# Patient Record
Sex: MALE | Race: WHITE | NOT HISPANIC OR LATINO
[De-identification: names, ages, dates, MRNs, and addresses within clinical notes are randomized per-mention and may not be internally consistent; named-entity substitution may affect disease eponyms.]

---

## 2019-09-07 ENCOUNTER — APPOINTMENT (OUTPATIENT)
Dept: ORTHOPEDIC SURGERY | Facility: CLINIC | Age: 59
End: 2019-09-07
Payer: COMMERCIAL

## 2019-09-07 DIAGNOSIS — M25.561 PAIN IN RIGHT KNEE: ICD-10-CM

## 2019-09-07 PROCEDURE — 99204 OFFICE O/P NEW MOD 45 MIN: CPT

## 2019-09-07 PROCEDURE — 73562 X-RAY EXAM OF KNEE 3: CPT | Mod: RT

## 2019-10-21 ENCOUNTER — APPOINTMENT (OUTPATIENT)
Dept: ORTHOPEDIC SURGERY | Facility: CLINIC | Age: 59
End: 2019-10-21
Payer: COMMERCIAL

## 2019-10-21 VITALS — HEART RATE: 79 BPM | DIASTOLIC BLOOD PRESSURE: 84 MMHG | SYSTOLIC BLOOD PRESSURE: 137 MMHG

## 2019-10-21 DIAGNOSIS — M17.11 UNILATERAL PRIMARY OSTEOARTHRITIS, RIGHT KNEE: ICD-10-CM

## 2019-10-21 PROCEDURE — 99213 OFFICE O/P EST LOW 20 MIN: CPT

## 2019-10-22 ENCOUNTER — OUTPATIENT (OUTPATIENT)
Dept: OUTPATIENT SERVICES | Facility: HOSPITAL | Age: 59
LOS: 1 days | End: 2019-10-22
Payer: COMMERCIAL

## 2019-10-22 VITALS
HEIGHT: 70 IN | OXYGEN SATURATION: 98 % | HEART RATE: 71 BPM | RESPIRATION RATE: 16 BRPM | DIASTOLIC BLOOD PRESSURE: 82 MMHG | WEIGHT: 279.99 LBS | TEMPERATURE: 98 F | SYSTOLIC BLOOD PRESSURE: 132 MMHG

## 2019-10-22 DIAGNOSIS — Z01.818 ENCOUNTER FOR OTHER PREPROCEDURAL EXAMINATION: ICD-10-CM

## 2019-10-22 DIAGNOSIS — Z98.890 OTHER SPECIFIED POSTPROCEDURAL STATES: Chronic | ICD-10-CM

## 2019-10-22 DIAGNOSIS — M17.11 UNILATERAL PRIMARY OSTEOARTHRITIS, RIGHT KNEE: ICD-10-CM

## 2019-10-22 LAB
ALBUMIN SERPL ELPH-MCNC: 3.6 G/DL — SIGNIFICANT CHANGE UP (ref 3.3–5)
ALP SERPL-CCNC: 96 U/L — SIGNIFICANT CHANGE UP (ref 30–120)
ALT FLD-CCNC: 23 U/L DA — SIGNIFICANT CHANGE UP (ref 10–60)
ANION GAP SERPL CALC-SCNC: 6 MMOL/L — SIGNIFICANT CHANGE UP (ref 5–17)
APTT BLD: 36.6 SEC — SIGNIFICANT CHANGE UP (ref 28.5–37)
AST SERPL-CCNC: 17 U/L — SIGNIFICANT CHANGE UP (ref 10–40)
BILIRUB SERPL-MCNC: 0.6 MG/DL — SIGNIFICANT CHANGE UP (ref 0.2–1.2)
BLD GP AB SCN SERPL QL: SIGNIFICANT CHANGE UP
BUN SERPL-MCNC: 12 MG/DL — SIGNIFICANT CHANGE UP (ref 7–23)
CALCIUM SERPL-MCNC: 9.4 MG/DL — SIGNIFICANT CHANGE UP (ref 8.4–10.5)
CHLORIDE SERPL-SCNC: 101 MMOL/L — SIGNIFICANT CHANGE UP (ref 96–108)
CO2 SERPL-SCNC: 30 MMOL/L — SIGNIFICANT CHANGE UP (ref 22–31)
CREAT SERPL-MCNC: 1.23 MG/DL — SIGNIFICANT CHANGE UP (ref 0.5–1.3)
GLUCOSE SERPL-MCNC: 104 MG/DL — HIGH (ref 70–99)
HCT VFR BLD CALC: 45.4 % — SIGNIFICANT CHANGE UP (ref 39–50)
HGB BLD-MCNC: 14.8 G/DL — SIGNIFICANT CHANGE UP (ref 13–17)
INR BLD: 1.21 RATIO — HIGH (ref 0.88–1.16)
MCHC RBC-ENTMCNC: 27.7 PG — SIGNIFICANT CHANGE UP (ref 27–34)
MCHC RBC-ENTMCNC: 32.6 GM/DL — SIGNIFICANT CHANGE UP (ref 32–36)
MCV RBC AUTO: 85 FL — SIGNIFICANT CHANGE UP (ref 80–100)
MRSA PCR RESULT.: SIGNIFICANT CHANGE UP
NRBC # BLD: 0 /100 WBCS — SIGNIFICANT CHANGE UP (ref 0–0)
PLATELET # BLD AUTO: 200 K/UL — SIGNIFICANT CHANGE UP (ref 150–400)
POTASSIUM SERPL-MCNC: 4.5 MMOL/L — SIGNIFICANT CHANGE UP (ref 3.5–5.3)
POTASSIUM SERPL-SCNC: 4.5 MMOL/L — SIGNIFICANT CHANGE UP (ref 3.5–5.3)
PROT SERPL-MCNC: 7.7 G/DL — SIGNIFICANT CHANGE UP (ref 6–8.3)
PROTHROM AB SERPL-ACNC: 13.3 SEC — HIGH (ref 10–12.9)
RBC # BLD: 5.34 M/UL — SIGNIFICANT CHANGE UP (ref 4.2–5.8)
RBC # FLD: 12.6 % — SIGNIFICANT CHANGE UP (ref 10.3–14.5)
S AUREUS DNA NOSE QL NAA+PROBE: DETECTED
SODIUM SERPL-SCNC: 137 MMOL/L — SIGNIFICANT CHANGE UP (ref 135–145)
WBC # BLD: 8.78 K/UL — SIGNIFICANT CHANGE UP (ref 3.8–10.5)
WBC # FLD AUTO: 8.78 K/UL — SIGNIFICANT CHANGE UP (ref 3.8–10.5)

## 2019-10-22 PROCEDURE — 93005 ELECTROCARDIOGRAM TRACING: CPT

## 2019-10-22 PROCEDURE — G0463: CPT

## 2019-10-22 PROCEDURE — 93010 ELECTROCARDIOGRAM REPORT: CPT

## 2019-10-22 NOTE — H&P PST ADULT - MUSCULOSKELETAL
detailed exam no joint erythema/joint swelling/diminished strength/no joint warmth/no calf tenderness/right knee/decreased ROM due to pain details…

## 2019-10-22 NOTE — H&P PST ADULT - NSICDXFAMILYHX_GEN_ALL_CORE_FT
FAMILY HISTORY:  Family history of breast cancer in mother,   Family history of coronary artery disease in father, father-  Family history of diabetes mellitus in father, father-

## 2019-10-22 NOTE — H&P PST ADULT - NSICDXPROBLEM_GEN_ALL_CORE_FT
PROBLEM DIAGNOSES  Problem: Primary osteoarthritis of right knee  Assessment and Plan: RIGHT Total Knee Replacement on 11/04/19.

## 2019-10-22 NOTE — H&P PST ADULT - ASSESSMENT
60yo male patient scheduled for surgery on 11/4/19. He will obtain Medical Clearance from his PMD. He will be NPO as per Anesthesia and will take no meds on AM of surgery. All pre-op instructions reviewed with patient. 60yo male patient scheduled for surgery on 11/4/19. He will obtain Medical Clearance from his PMD. He will be NPO as per Anesthesia and will take no meds on AM of surgery. He will hold Ibuprofen starting on 10/28/19. All pre-op instructions reviewed with patient.

## 2019-10-22 NOTE — H&P PST ADULT - HISTORY OF PRESENT ILLNESS
60yo male patient who states that he has a two year history of progressively worsening right knee pain. He states that with weight bearing his knee starts to swell. He rates the pain at 10/10 after prolonged standing, he states that his knee is "on fire" and his knee pain is also problematic at night. He is not taking anything for pain. He was told that TKR is recommended and he presents today for PSTs.

## 2019-10-22 NOTE — H&P PST ADULT - NSICDXPASTMEDICALHX_GEN_ALL_CORE_FT
PAST MEDICAL HISTORY:  Class 3 severe obesity with body mass index (BMI) of 40.0 to 44.9 in adult     DUANE (obstructive sleep apnea) no treatment PAST MEDICAL HISTORY:  Class 3 severe obesity with body mass index (BMI) of 40.0 to 44.9 in adult     DUANE (obstructive sleep apnea) no treatment    Osteoarthritis PAST MEDICAL HISTORY:  Class 3 severe obesity with body mass index (BMI) of 40.0 to 44.9 in adult     Motion sickness     DUANE (obstructive sleep apnea) no treatment    Osteoarthritis

## 2019-10-22 NOTE — H&P PST ADULT - NSICDXPASTSURGICALHX_GEN_ALL_CORE_FT
PAST SURGICAL HISTORY:  S/P ORIF (open reduction internal fixation) fracture 1994- LEFT Knee    S/P right knee surgery 1977- ligament repair

## 2019-10-23 RX ORDER — MUPIROCIN 20 MG/G
1 OINTMENT TOPICAL
Qty: 1 | Refills: 0
Start: 2019-10-23 | End: 2019-10-27

## 2019-10-23 NOTE — PROGRESS NOTE ADULT - SUBJECTIVE AND OBJECTIVE BOX
Positive MSSA. Prescribed ointment. Unable to reach patient to leave message. Will call surgeon. Need to f/U Positive MSSA. Prescribed ointment. Unable to reach patient to leave message. Will call surgeon. Need to f/U    Attempted to reach patient, answering machine is not taking messages.  NP called patient's pharmacy and was told that patient did NOT  his prescription yet for mupirocin.  This message was relayed to answering machine of VIRA Guillen NP    SS

## 2019-10-29 NOTE — PROVIDER CONTACT NOTE (OTHER) - ACTION/TREATMENT ORDERED:
called pt cell #. Not taking messages. Called wife, Camille, who states pt picked up Mupirocin and has been using twice a day. Pt's wife to remind pt to use for 5 days for a total of 10 doses.

## 2019-11-01 LAB
INR BLD: 1.17 RATIO — HIGH (ref 0.88–1.16)
PROTHROM AB SERPL-ACNC: 12.8 SEC — SIGNIFICANT CHANGE UP (ref 10–12.9)

## 2019-11-04 ENCOUNTER — APPOINTMENT (OUTPATIENT)
Dept: ORTHOPEDIC SURGERY | Facility: HOSPITAL | Age: 59
End: 2019-11-04

## 2019-11-04 ENCOUNTER — INPATIENT (INPATIENT)
Facility: HOSPITAL | Age: 59
LOS: 2 days | Discharge: HOME | DRG: 470 | End: 2019-11-07
Attending: ORTHOPAEDIC SURGERY | Admitting: ORTHOPAEDIC SURGERY
Payer: COMMERCIAL

## 2019-11-04 ENCOUNTER — RESULT REVIEW (OUTPATIENT)
Age: 59
End: 2019-11-04

## 2019-11-04 VITALS
HEART RATE: 75 BPM | OXYGEN SATURATION: 99 % | TEMPERATURE: 98 F | DIASTOLIC BLOOD PRESSURE: 75 MMHG | SYSTOLIC BLOOD PRESSURE: 167 MMHG | WEIGHT: 273.37 LBS | HEIGHT: 70 IN | RESPIRATION RATE: 16 BRPM

## 2019-11-04 DIAGNOSIS — Z98.890 OTHER SPECIFIED POSTPROCEDURAL STATES: Chronic | ICD-10-CM

## 2019-11-04 DIAGNOSIS — G47.33 OBSTRUCTIVE SLEEP APNEA (ADULT) (PEDIATRIC): ICD-10-CM

## 2019-11-04 DIAGNOSIS — E66.01 MORBID (SEVERE) OBESITY DUE TO EXCESS CALORIES: ICD-10-CM

## 2019-11-04 DIAGNOSIS — M17.11 UNILATERAL PRIMARY OSTEOARTHRITIS, RIGHT KNEE: ICD-10-CM

## 2019-11-04 DIAGNOSIS — M19.90 UNSPECIFIED OSTEOARTHRITIS, UNSPECIFIED SITE: ICD-10-CM

## 2019-11-04 PROBLEM — T75.3XXA MOTION SICKNESS, INITIAL ENCOUNTER: Chronic | Status: ACTIVE | Noted: 2019-10-22

## 2019-11-04 LAB
ANION GAP SERPL CALC-SCNC: 9 MMOL/L — SIGNIFICANT CHANGE UP (ref 5–17)
BUN SERPL-MCNC: 17 MG/DL — SIGNIFICANT CHANGE UP (ref 7–23)
CALCIUM SERPL-MCNC: 9.3 MG/DL — SIGNIFICANT CHANGE UP (ref 8.4–10.5)
CHLORIDE SERPL-SCNC: 99 MMOL/L — SIGNIFICANT CHANGE UP (ref 96–108)
CO2 SERPL-SCNC: 28 MMOL/L — SIGNIFICANT CHANGE UP (ref 22–31)
CREAT SERPL-MCNC: 1.29 MG/DL — SIGNIFICANT CHANGE UP (ref 0.5–1.3)
GLUCOSE SERPL-MCNC: 147 MG/DL — HIGH (ref 70–99)
HCT VFR BLD CALC: 45.7 % — SIGNIFICANT CHANGE UP (ref 39–50)
HGB BLD-MCNC: 14.9 G/DL — SIGNIFICANT CHANGE UP (ref 13–17)
POTASSIUM SERPL-MCNC: 4.7 MMOL/L — SIGNIFICANT CHANGE UP (ref 3.5–5.3)
POTASSIUM SERPL-SCNC: 4.7 MMOL/L — SIGNIFICANT CHANGE UP (ref 3.5–5.3)
SODIUM SERPL-SCNC: 136 MMOL/L — SIGNIFICANT CHANGE UP (ref 135–145)

## 2019-11-04 PROCEDURE — 27447 TOTAL KNEE ARTHROPLASTY: CPT | Mod: RT

## 2019-11-04 PROCEDURE — 99223 1ST HOSP IP/OBS HIGH 75: CPT

## 2019-11-04 PROCEDURE — 88305 TISSUE EXAM BY PATHOLOGIST: CPT | Mod: 26

## 2019-11-04 PROCEDURE — 88311 DECALCIFY TISSUE: CPT | Mod: 26

## 2019-11-04 PROCEDURE — 73562 X-RAY EXAM OF KNEE 3: CPT | Mod: 26,RT

## 2019-11-04 PROCEDURE — 27447 TOTAL KNEE ARTHROPLASTY: CPT | Mod: 82,RT

## 2019-11-04 RX ORDER — SODIUM CHLORIDE 9 MG/ML
1000 INJECTION, SOLUTION INTRAVENOUS
Refills: 0 | Status: DISCONTINUED | OUTPATIENT
Start: 2019-11-04 | End: 2019-11-07

## 2019-11-04 RX ORDER — APREPITANT 80 MG/1
40 CAPSULE ORAL ONCE
Refills: 0 | Status: COMPLETED | OUTPATIENT
Start: 2019-11-04 | End: 2019-11-04

## 2019-11-04 RX ORDER — CEFAZOLIN SODIUM 1 G
3000 VIAL (EA) INJECTION ONCE
Refills: 0 | Status: COMPLETED | OUTPATIENT
Start: 2019-11-04 | End: 2019-11-04

## 2019-11-04 RX ORDER — CEFAZOLIN SODIUM 1 G
3000 VIAL (EA) INJECTION EVERY 8 HOURS
Refills: 0 | Status: COMPLETED | OUTPATIENT
Start: 2019-11-04 | End: 2019-11-04

## 2019-11-04 RX ORDER — TRANEXAMIC ACID 100 MG/ML
1000 INJECTION, SOLUTION INTRAVENOUS ONCE
Refills: 0 | Status: COMPLETED | OUTPATIENT
Start: 2019-11-04 | End: 2019-11-04

## 2019-11-04 RX ORDER — ACETAMINOPHEN 500 MG
1000 TABLET ORAL ONCE
Refills: 0 | Status: COMPLETED | OUTPATIENT
Start: 2019-11-04 | End: 2019-11-04

## 2019-11-04 RX ORDER — CELECOXIB 200 MG/1
200 CAPSULE ORAL EVERY 12 HOURS
Refills: 0 | Status: DISCONTINUED | OUTPATIENT
Start: 2019-11-04 | End: 2019-11-07

## 2019-11-04 RX ORDER — PANTOPRAZOLE SODIUM 20 MG/1
40 TABLET, DELAYED RELEASE ORAL
Refills: 0 | Status: DISCONTINUED | OUTPATIENT
Start: 2019-11-04 | End: 2019-11-07

## 2019-11-04 RX ORDER — POLYETHYLENE GLYCOL 3350 17 G/17G
17 POWDER, FOR SOLUTION ORAL DAILY
Refills: 0 | Status: DISCONTINUED | OUTPATIENT
Start: 2019-11-04 | End: 2019-11-07

## 2019-11-04 RX ORDER — ASPIRIN/CALCIUM CARB/MAGNESIUM 324 MG
81 TABLET ORAL EVERY 12 HOURS
Refills: 0 | Status: DISCONTINUED | OUTPATIENT
Start: 2019-11-05 | End: 2019-11-07

## 2019-11-04 RX ORDER — SENNA PLUS 8.6 MG/1
2 TABLET ORAL AT BEDTIME
Refills: 0 | Status: DISCONTINUED | OUTPATIENT
Start: 2019-11-04 | End: 2019-11-07

## 2019-11-04 RX ORDER — CHLORHEXIDINE GLUCONATE 213 G/1000ML
1 SOLUTION TOPICAL ONCE
Refills: 0 | Status: COMPLETED | OUTPATIENT
Start: 2019-11-04 | End: 2019-11-04

## 2019-11-04 RX ORDER — ONDANSETRON 8 MG/1
4 TABLET, FILM COATED ORAL ONCE
Refills: 0 | Status: DISCONTINUED | OUTPATIENT
Start: 2019-11-04 | End: 2019-11-04

## 2019-11-04 RX ORDER — OXYCODONE HYDROCHLORIDE 5 MG/1
10 TABLET ORAL
Refills: 0 | Status: DISCONTINUED | OUTPATIENT
Start: 2019-11-04 | End: 2019-11-06

## 2019-11-04 RX ORDER — MAGNESIUM HYDROXIDE 400 MG/1
30 TABLET, CHEWABLE ORAL DAILY
Refills: 0 | Status: DISCONTINUED | OUTPATIENT
Start: 2019-11-04 | End: 2019-11-07

## 2019-11-04 RX ORDER — SODIUM CHLORIDE 9 MG/ML
1000 INJECTION, SOLUTION INTRAVENOUS
Refills: 0 | Status: DISCONTINUED | OUTPATIENT
Start: 2019-11-04 | End: 2019-11-04

## 2019-11-04 RX ORDER — OXYCODONE HYDROCHLORIDE 5 MG/1
5 TABLET ORAL
Refills: 0 | Status: DISCONTINUED | OUTPATIENT
Start: 2019-11-04 | End: 2019-11-06

## 2019-11-04 RX ORDER — HYDROMORPHONE HYDROCHLORIDE 2 MG/ML
0.5 INJECTION INTRAMUSCULAR; INTRAVENOUS; SUBCUTANEOUS
Refills: 0 | Status: DISCONTINUED | OUTPATIENT
Start: 2019-11-04 | End: 2019-11-07

## 2019-11-04 RX ORDER — ACETAMINOPHEN 500 MG
1000 TABLET ORAL EVERY 8 HOURS
Refills: 0 | Status: DISCONTINUED | OUTPATIENT
Start: 2019-11-05 | End: 2019-11-07

## 2019-11-04 RX ORDER — ONDANSETRON 8 MG/1
4 TABLET, FILM COATED ORAL EVERY 6 HOURS
Refills: 0 | Status: DISCONTINUED | OUTPATIENT
Start: 2019-11-04 | End: 2019-11-07

## 2019-11-04 RX ORDER — HYDROMORPHONE HYDROCHLORIDE 2 MG/ML
0.5 INJECTION INTRAMUSCULAR; INTRAVENOUS; SUBCUTANEOUS
Refills: 0 | Status: DISCONTINUED | OUTPATIENT
Start: 2019-11-04 | End: 2019-11-04

## 2019-11-04 RX ORDER — ACETAMINOPHEN 500 MG
1000 TABLET ORAL EVERY 6 HOURS
Refills: 0 | Status: COMPLETED | OUTPATIENT
Start: 2019-11-04 | End: 2019-11-05

## 2019-11-04 RX ADMIN — Medication 1000 MILLIGRAM(S): at 20:02

## 2019-11-04 RX ADMIN — Medication 400 MILLIGRAM(S): at 14:26

## 2019-11-04 RX ADMIN — CELECOXIB 200 MILLIGRAM(S): 200 CAPSULE ORAL at 21:13

## 2019-11-04 RX ADMIN — SODIUM CHLORIDE 100 MILLILITER(S): 9 INJECTION, SOLUTION INTRAVENOUS at 14:25

## 2019-11-04 RX ADMIN — CELECOXIB 200 MILLIGRAM(S): 200 CAPSULE ORAL at 21:14

## 2019-11-04 RX ADMIN — Medication 1000 MILLIGRAM(S): at 15:00

## 2019-11-04 RX ADMIN — SODIUM CHLORIDE 50 MILLILITER(S): 9 INJECTION, SOLUTION INTRAVENOUS at 13:12

## 2019-11-04 RX ADMIN — Medication 400 MILLIGRAM(S): at 20:00

## 2019-11-04 RX ADMIN — OXYCODONE HYDROCHLORIDE 5 MILLIGRAM(S): 5 TABLET ORAL at 14:14

## 2019-11-04 RX ADMIN — APREPITANT 40 MILLIGRAM(S): 80 CAPSULE ORAL at 06:46

## 2019-11-04 RX ADMIN — Medication 200 MILLIGRAM(S): at 23:35

## 2019-11-04 RX ADMIN — SENNA PLUS 2 TABLET(S): 8.6 TABLET ORAL at 21:14

## 2019-11-04 RX ADMIN — Medication 200 MILLIGRAM(S): at 15:43

## 2019-11-04 RX ADMIN — CHLORHEXIDINE GLUCONATE 1 APPLICATION(S): 213 SOLUTION TOPICAL at 06:47

## 2019-11-04 RX ADMIN — OXYCODONE HYDROCHLORIDE 5 MILLIGRAM(S): 5 TABLET ORAL at 15:00

## 2019-11-04 NOTE — BRIEF OPERATIVE NOTE - NSICDXBRIEFPOSTOP_GEN_ALL_CORE_FT
POST-OP DIAGNOSIS:  Primary localized osteoarthritis of right knee 04-Nov-2019 09:35:38  Dewey Blackburn  Primary localized osteoarthritis of right knee 04-Nov-2019 09:35:35  Dewey Blackburn

## 2019-11-04 NOTE — OCCUPATIONAL THERAPY INITIAL EVALUATION ADULT - ADDITIONAL COMMENTS
+stall 5 SAE 1 HR, 20 steps 2nd level 1 HR to bedroom and full bathroom with bathtub/curtains, no DME. Pt states he can stay on 1st level initially, + bedroom & 1/2 bath

## 2019-11-04 NOTE — PHYSICAL THERAPY INITIAL EVALUATION ADULT - GAIT TRAINING, PT EVAL
Ambulate 150 feet with RW independently in 2-3 days . Negotiate 15 steps with 1 handrail and cane independently WBAT  LE in 2-3 days.

## 2019-11-04 NOTE — PHYSICAL THERAPY INITIAL EVALUATION ADULT - ACTIVE RANGE OF MOTION EXAMINATION, REHAB EVAL
deficits as listed below/Right ankle/foot WNL, right knee decreased due andie sx, right hip WNL/rakel. upper extremity Active ROM was WNL (within normal limits)/LLE Active ROM was WNL (within normal limits)

## 2019-11-04 NOTE — OCCUPATIONAL THERAPY INITIAL EVALUATION ADULT - IMPAIRMENTS CONTRIBUTING IMPAIRED BED MOBILITY, REHAB EVAL
+ sheets change (appears + urinary incont, pt states he feels urge to urinate while standing & able to void 700 in urinal with assist x 1, RW. Melanie Deal RN notified/decreased strength/decreased sensation

## 2019-11-04 NOTE — BRIEF OPERATIVE NOTE - NSICDXBRIEFPREOP_GEN_ALL_CORE_FT
PRE-OP DIAGNOSIS:  Primary localized osteoarthritis of right knee 04-Nov-2019 09:35:41  Dewey Blackburn

## 2019-11-04 NOTE — CONSULT NOTE ADULT - SUBJECTIVE AND OBJECTIVE BOX
Patient is a 59y old  Male who presents with a chief complaint of     HPI: 59M who presented with complaint of two year history of progressively worsening right knee pain. He states that with weight bearing his knee starts to swell. He rates the pain at 10/10 after prolonged standing, he states that his knee is "on fire" and his knee pain is also problematic at night. He is not taking anything for pain.   He does have history of DUANE. does not sleep well at night and has daytime somnolence.  Was unable to tolerate CPAP trials due to claustrophobia with the nasal pillows and mask.  has been looking into getting inspire with pulmonologist but has not yet done so.     REVIEW OF SYSTEMS:  CONSTITUTIONAL: No fever, weight loss, or fatigue  EYES: No eye pain, visual disturbances, or discharge  ENMT:  No difficulty hearing, tinnitus, vertigo; No sinus or throat pain  NECK: No pain or stiffness  BREASTS: No pain, masses, or nipple discharge  RESPIRATORY: No cough, wheezing, chills or hemoptysis; No shortness of breath  CARDIOVASCULAR: No chest pain, palpitations, dizziness, or leg swelling  GASTROINTESTINAL: No abdominal or epigastric pain. No nausea, vomiting, or hematemesis; No diarrhea or constipation. No melena or hematochezia.  GENITOURINARY: No dysuria, frequency, hematuria, or incontinence  NEUROLOGICAL: No headaches, memory loss, loss of strength, numbness, or tremors  SKIN: No itching, burning, rashes, or lesions   LYMPH NODES: No enlarged glands  ENDOCRINE: No heat or cold intolerance; No hair loss  MUSCULOSKELETAL: No muscle or back pain  PSYCHIATRIC: No depression, anxiety, mood swings, or difficulty sleeping  HEME/LYMPH: No easy bruising, or bleeding gums  ALLERGY AND IMMUNOLOGIC: No hives or eczema    PAST MEDICAL & SURGICAL HISTORY:  Motion sickness  Osteoarthritis  Class 3 severe obesity with body mass index (BMI) of 40.0 to 44.9 in adult  DUANE (obstructive sleep apnea): no treatment  S/P ORIF (open reduction internal fixation) fracture: - LEFT Knee  S/P right knee surgery: - ligament repair      SOCIAL HISTORY:  Residence: [ ] [facility]  [ ] SNF  [x ] Community  [ ] Substance abuse: denies  [ ] Tobacco: denies  [ ] Alcohol use: denies    Allergies  No Known Allergies    MEDICATIONS  (STANDING):  acetaminophen  IVPB .. 1000 milliGRAM(s) IV Intermittent every 6 hours  ceFAZolin   IVPB 3000 milliGRAM(s) IV Intermittent every 8 hours  celecoxib 200 milliGRAM(s) Oral every 12 hours  lactated ringers. 1000 milliLiter(s) (100 mL/Hr) IV Continuous <Continuous>  pantoprazole    Tablet 40 milliGRAM(s) Oral before breakfast  polyethylene glycol 3350 17 Gram(s) Oral daily    MEDICATIONS  (PRN):  aluminum hydroxide/magnesium hydroxide/simethicone Suspension 30 milliLiter(s) Oral four times a day PRN Indigestion  HYDROmorphone  Injectable 0.5 milliGRAM(s) IV Push every 3 hours PRN Severe Pain (7 - 10)  magnesium hydroxide Suspension 30 milliLiter(s) Oral daily PRN Constipation  ondansetron Injectable 4 milliGRAM(s) IV Push every 6 hours PRN Nausea and/or Vomiting  oxyCODONE    IR 5 milliGRAM(s) Oral every 3 hours PRN Mild Pain (1 - 3)  oxyCODONE    IR 10 milliGRAM(s) Oral every 3 hours PRN Moderate Pain (4 - 6)  senna 2 Tablet(s) Oral at bedtime PRN Constipation      FAMILY HISTORY:  Family history of breast cancer in mother:   Family history of coronary artery disease in father: father-  Family history of diabetes mellitus in father: father-       Vital Signs Last 24 Hrs  T(C): 36.4 (2019 13:58), Max: 36.9 (2019 06:41)  T(F): 97.5 (2019 13:58), Max: 98.5 (2019 06:41)  HR: 71 (2019 13:36) (59 - 81)  BP: 150/80 (2019 13:58) (129/69 - 167/75)  BP(mean): --  RR: 18 (2019 13:58) (11 - 23)  SpO2: 97% (2019 13:58) (92% - 100%)  BMI 39    PHYSICAL EXAM:    GENERAL: NAD, well-groomed, well-developed  HEAD:  Atraumatic, Normocephalic  EYES:  conjunctiva and sclera clear  ENMT:  Moist mucous membranes  NECK: Supple, No JVD,  NERVOUS SYSTEM:  Alert & Oriented X3, Good concentration; Moving all 4 extremities; No gross sensory deficits  CHEST/LUNG: Clear to auscultation bilaterally; No rales, rhonchi, wheezing, or rubs  HEART: Regular rate and rhythm; No murmurs, rubs, or gallops  ABDOMEN: Soft, Nontender, Nondistended; Bowel sounds present  EXTREMITIES:  2+ Peripheral Pulses, No clubbing, cyanosis, or edema  LYMPH: No lymphadenopathy noted  /RECTAL: Not examined  BREAST: Not examined  SKIN: No rashes or lesions  INCISION: dressing dry and intact    LABS:              CAPILLARY BLOOD GLUCOSE          RADIOLOGY & ADDITIONAL STUDIES:    EKG: nsr  Personally Reviewed:  [x ] YES     Imaging: tkr in place  Personally Reviewed:  [ x] YES     Consultant(s) Notes Reviewed:  pre-op med eval reviewed    Care Discussed with Consultants/Other Providers:  Dr qIbal - message left re: consult

## 2019-11-04 NOTE — PROGRESS NOTE ADULT - SUBJECTIVE AND OBJECTIVE BOX
Orthopaedic Post Op Note    Procedure: Right TKR  Surgeon: Anthony Hesnon    59y Male comfortable, without complaints. + voiding. Tolerating diet. Reported pain score = 0  Denies N/V, CP, SOB, numbness/tingling of extremities.    PE:  Vital Signs Last 24 Hrs  T(C): 36.4 (04 Nov 2019 13:58), Max: 36.9 (04 Nov 2019 06:41)  T(F): 97.5 (04 Nov 2019 13:58), Max: 98.5 (04 Nov 2019 06:41)  HR: 71 (04 Nov 2019 13:36) (59 - 81)  BP: 150/80 (04 Nov 2019 13:58) (129/69 - 167/75)  RR: 18 (04 Nov 2019 13:58) (11 - 23)  SpO2: 97% (04 Nov 2019 13:58) (92% - 100%)  General: Pt alert and oriented   Lungs: + BS CTA bilaterally  Heart: +S1 & S2 heard, RRR  Abd: + BS heard, soft, NT, ND  Right Knee Dressing: C/D/I   Bilateral LEs:  Motor:   5/5 dorsiflexion, plantarflexion, EHL  Sensation intact to LT  2+ DP Pulses  SCDs in place    A/P: 59y Male POD#0 s/p Left TKR  - Stable  - Acetaminophen, Celebrex, Dilaudid/Oxycodone for Pain Control   - DVT ppx: Aspirin 81mg q 12h  - Marbella op IV abx: Ancef  - PT, OT per protocol  - F/U AM Labs  DCP = Home Wednesday pending PT, OT, Medical clearance

## 2019-11-04 NOTE — OCCUPATIONAL THERAPY INITIAL EVALUATION ADULT - IMPAIRED TRANSFERS: SIT/STAND, REHAB EVAL
slight right knee buckling at times, pt able to compensate using UEs on RW/decreased sensation/decreased strength

## 2019-11-04 NOTE — CONSULT NOTE ADULT - PROBLEM SELECTOR RECOMMENDATION 2
patient has intolerance to CPAP.    remote tele ordered  explained importance of wearing nasal canula and try to keep HOB>30 while sleeping  Requested consult -message left for Dr Iqbal.

## 2019-11-04 NOTE — OCCUPATIONAL THERAPY INITIAL EVALUATION ADULT - GENERAL OBSERVATIONS, REHAB EVAL
Pt found in bed IV, + SCDs , supplemental 02, Pt found in bed IV, + SCDs , supplemental 02, +Adductor canal block, + spinal anesthesia

## 2019-11-04 NOTE — CONSULT NOTE ADULT - PROBLEM SELECTOR RECOMMENDATION 9
Pain Management: acceptable- continue current care Tylenol ATC/Celebrex ATC/ Oxycodone PRN  Continue PT/OT  DVT proph: [ x ] low risk - Aspirin   DC plan:  [x  ] Home with HC  [  ] Rehab   [  ] TBD  [  ]other:___________

## 2019-11-04 NOTE — CONSULT NOTE ADULT - SUBJECTIVE AND OBJECTIVE BOX
PULMONARY/CRITICAL CARE        Patient is a 59y old  Male who presents with a chief complaint of TKR Right  BRIEF HOSPITAL COURSE: ***    Events last 24 hours: ***    PAST MEDICAL & SURGICAL HISTORY:  Motion sickness  Osteoarthritis  Class 3 severe obesity with body mass index (BMI) of 40.0 to 44.9 in adult  DUANE (obstructive sleep apnea): never did sleep study  S/P ORIF (open reduction internal fixation) fracture: - LEFT Knee  S/P right knee surgery: - ligament repair    Allergies    No Known Allergies    Intolerances      FAMILY HISTORY/ SOCIAL: no cigs, etoh  Family history of breast cancer in mother:   Family history of coronary artery disease in father: father-  Family history of diabetes mellitus in father: father-       Review of Systems:  CONSTITUTIONAL: No fever, chills, or fatigue  EYES: No eye pain, visual disturbances, or discharge  ENMT:  No difficulty hearing, tinnitus, vertigo; No sinus or throat pain Has snoring, daytime somnolence. Sleeps on side  NECK: No pain or stiffness  RESPIRATORY: No cough, wheezing, chills or hemoptysis; No shortness of breath  CARDIOVASCULAR: No chest pain, palpitations, dizziness, or leg swelling  GASTROINTESTINAL: No abdominal or epigastric pain. No nausea, vomiting, or hematemesis; No diarrhea or constipation. No melena or hematochezia.  GENITOURINARY: No dysuria, frequency, hematuria, or incontinence  NEUROLOGICAL: No headaches, memory loss, loss of strength, numbness, or tremors  SKIN: No itching, burning, rashes, or lesions   MUSCULOSKELETAL: right knee  joint pain no muscle, back, or extremity pain  PSYCHIATRIC: No depression, anxiety, mood swings, or difficulty sleeping      Medications:  ceFAZolin   IVPB 3000 milliGRAM(s) IV Intermittent every 8 hours        acetaminophen  IVPB .. 1000 milliGRAM(s) IV Intermittent every 6 hours  celecoxib 200 milliGRAM(s) Oral every 12 hours  HYDROmorphone  Injectable 0.5 milliGRAM(s) IV Push every 3 hours PRN  ondansetron Injectable 4 milliGRAM(s) IV Push every 6 hours PRN  oxyCODONE    IR 5 milliGRAM(s) Oral every 3 hours PRN  oxyCODONE    IR 10 milliGRAM(s) Oral every 3 hours PRN        aluminum hydroxide/magnesium hydroxide/simethicone Suspension 30 milliLiter(s) Oral four times a day PRN  magnesium hydroxide Suspension 30 milliLiter(s) Oral daily PRN  pantoprazole    Tablet 40 milliGRAM(s) Oral before breakfast  polyethylene glycol 3350 17 Gram(s) Oral daily  senna 2 Tablet(s) Oral at bedtime PRN        lactated ringers. 1000 milliLiter(s) IV Continuous <Continuous>                ICU Vital Signs Last 24 Hrs  T(C): 36.4 (2019 13:58), Max: 36.9 (2019 06:41)  T(F): 97.5 (2019 13:58), Max: 98.5 (2019 06:41)  HR: 71 (2019 13:36) (59 - 81)  BP: 150/80 (2019 13:58) (129/69 - 167/75)  BP(mean): --  ABP: --  ABP(mean): --  RR: 18 (2019 13:58) (11 - 23)  SpO2: 97% (2019 13:58) (92% - 100%)    Vital Signs Last 24 Hrs  T(C): 36.4 (2019 13:58), Max: 36.9 (2019 06:41)  T(F): 97.5 (2019 13:58), Max: 98.5 (2019 06:41)  HR: 71 (2019 13:36) (59 - 81)  BP: 150/80 (2019 13:58) (129/69 - 167/75)  BP(mean): --  RR: 18 (2019 13:58) (11 - 23)  SpO2: 97% (2019 13:58) (92% - 100%)        I&O's Detail    2019 07:01  -  2019 18:40  --------------------------------------------------------  IN:    lactated ringers.: 1100 mL  Total IN: 1100 mL    OUT:    Estimated Blood Loss: 150 mL  Total OUT: 150 mL    Total NET: 950 mL            LABS:                        14.9   x     )-----------( x        ( 2019 17:35 )             45.7     11-04    136  |  99  |  17  ----------------------------<  147<H>  4.7   |  28  |  1.29    Ca    9.3      2019 17:35            CAPILLARY BLOOD GLUCOSE            CULTURES:      Physical Examination:    General: No acute distress.  Obese male    HEENT: Pupils equal, reactive to light.  Symmetric.    PULM: Clear to auscultation bilaterally, no significant sputum production    CVS: Regular rate and rhythm, no murmurs, rubs, or gallops    ABD: Soft, nondistended, nontender, normoactive bowel sounds, no masses    EXT: No edema, nontender  Right knee bandaged    SKIN: Warm and well perfused, no rashes noted.    NEURO: Alert, oriented, interactive, nonfocal    RADIOLOGY: ***    CRITICAL CARE TIME SPENT: ***

## 2019-11-05 ENCOUNTER — TRANSCRIPTION ENCOUNTER (OUTPATIENT)
Age: 59
End: 2019-11-05

## 2019-11-05 LAB
ANION GAP SERPL CALC-SCNC: 13 MMOL/L — SIGNIFICANT CHANGE UP (ref 5–17)
BUN SERPL-MCNC: 19 MG/DL — SIGNIFICANT CHANGE UP (ref 7–23)
CALCIUM SERPL-MCNC: 9 MG/DL — SIGNIFICANT CHANGE UP (ref 8.4–10.5)
CHLORIDE SERPL-SCNC: 98 MMOL/L — SIGNIFICANT CHANGE UP (ref 96–108)
CO2 SERPL-SCNC: 22 MMOL/L — SIGNIFICANT CHANGE UP (ref 22–31)
CREAT SERPL-MCNC: 1.18 MG/DL — SIGNIFICANT CHANGE UP (ref 0.5–1.3)
GLUCOSE SERPL-MCNC: 110 MG/DL — HIGH (ref 70–99)
HCT VFR BLD CALC: 42.2 % — SIGNIFICANT CHANGE UP (ref 39–50)
HGB BLD-MCNC: 13.8 G/DL — SIGNIFICANT CHANGE UP (ref 13–17)
MCHC RBC-ENTMCNC: 27.7 PG — SIGNIFICANT CHANGE UP (ref 27–34)
MCHC RBC-ENTMCNC: 32.7 GM/DL — SIGNIFICANT CHANGE UP (ref 32–36)
MCV RBC AUTO: 84.6 FL — SIGNIFICANT CHANGE UP (ref 80–100)
NRBC # BLD: 0 /100 WBCS — SIGNIFICANT CHANGE UP (ref 0–0)
PLATELET # BLD AUTO: 223 K/UL — SIGNIFICANT CHANGE UP (ref 150–400)
POTASSIUM SERPL-MCNC: 4.3 MMOL/L — SIGNIFICANT CHANGE UP (ref 3.5–5.3)
POTASSIUM SERPL-SCNC: 4.3 MMOL/L — SIGNIFICANT CHANGE UP (ref 3.5–5.3)
RBC # BLD: 4.99 M/UL — SIGNIFICANT CHANGE UP (ref 4.2–5.8)
RBC # FLD: 12.9 % — SIGNIFICANT CHANGE UP (ref 10.3–14.5)
SODIUM SERPL-SCNC: 133 MMOL/L — LOW (ref 135–145)
WBC # BLD: 13.23 K/UL — HIGH (ref 3.8–10.5)
WBC # FLD AUTO: 13.23 K/UL — HIGH (ref 3.8–10.5)

## 2019-11-05 PROCEDURE — 99232 SBSQ HOSP IP/OBS MODERATE 35: CPT

## 2019-11-05 RX ORDER — ACETAMINOPHEN 500 MG
2 TABLET ORAL
Qty: 0 | Refills: 0 | DISCHARGE
Start: 2019-11-05 | End: 2019-11-18

## 2019-11-05 RX ORDER — PANTOPRAZOLE SODIUM 20 MG/1
1 TABLET, DELAYED RELEASE ORAL
Qty: 30 | Refills: 1
Start: 2019-11-05 | End: 2020-01-03

## 2019-11-05 RX ORDER — SENNA PLUS 8.6 MG/1
2 TABLET ORAL
Qty: 0 | Refills: 0 | DISCHARGE
Start: 2019-11-05

## 2019-11-05 RX ORDER — IBUPROFEN 200 MG
2 TABLET ORAL
Qty: 0 | Refills: 0 | DISCHARGE

## 2019-11-05 RX ORDER — ASPIRIN/CALCIUM CARB/MAGNESIUM 324 MG
1 TABLET ORAL
Qty: 82 | Refills: 0
Start: 2019-11-05 | End: 2019-12-15

## 2019-11-05 RX ORDER — CELECOXIB 200 MG/1
1 CAPSULE ORAL
Qty: 60 | Refills: 0
Start: 2019-11-05 | End: 2019-12-04

## 2019-11-05 RX ORDER — POLYETHYLENE GLYCOL 3350 17 G/17G
17 POWDER, FOR SOLUTION ORAL
Qty: 0 | Refills: 0 | DISCHARGE
Start: 2019-11-05

## 2019-11-05 RX ORDER — OXYCODONE HYDROCHLORIDE 5 MG/1
1 TABLET ORAL
Qty: 42 | Refills: 0
Start: 2019-11-05 | End: 2019-11-11

## 2019-11-05 RX ADMIN — CELECOXIB 200 MILLIGRAM(S): 200 CAPSULE ORAL at 09:24

## 2019-11-05 RX ADMIN — Medication 1000 MILLIGRAM(S): at 02:02

## 2019-11-05 RX ADMIN — Medication 81 MILLIGRAM(S): at 05:58

## 2019-11-05 RX ADMIN — CELECOXIB 200 MILLIGRAM(S): 200 CAPSULE ORAL at 21:02

## 2019-11-05 RX ADMIN — Medication 400 MILLIGRAM(S): at 02:00

## 2019-11-05 RX ADMIN — Medication 1000 MILLIGRAM(S): at 10:00

## 2019-11-05 RX ADMIN — Medication 1000 MILLIGRAM(S): at 09:23

## 2019-11-05 RX ADMIN — OXYCODONE HYDROCHLORIDE 10 MILLIGRAM(S): 5 TABLET ORAL at 14:30

## 2019-11-05 RX ADMIN — Medication 81 MILLIGRAM(S): at 17:29

## 2019-11-05 RX ADMIN — PANTOPRAZOLE SODIUM 40 MILLIGRAM(S): 20 TABLET, DELAYED RELEASE ORAL at 05:58

## 2019-11-05 RX ADMIN — CELECOXIB 200 MILLIGRAM(S): 200 CAPSULE ORAL at 10:00

## 2019-11-05 RX ADMIN — Medication 1000 MILLIGRAM(S): at 17:29

## 2019-11-05 RX ADMIN — OXYCODONE HYDROCHLORIDE 10 MILLIGRAM(S): 5 TABLET ORAL at 13:40

## 2019-11-05 RX ADMIN — Medication 1000 MILLIGRAM(S): at 18:00

## 2019-11-05 NOTE — DISCHARGE NOTE NURSING/CASE MANAGEMENT/SOCIAL WORK - PATIENT PORTAL LINK FT
You can access the FollowMyHealth Patient Portal offered by [facility] by registering at the following website: [email or web address]. By joining [facility]’s FollowMyHealth portal, you will also be able to view your health information using other applications (apps) compatible with our system.

## 2019-11-05 NOTE — DISCHARGE NOTE PROVIDER - NSDCFUADDINST_GEN_ALL_CORE_FT
Your new total knee requires proper care.  Your care provider is your best resource for care information.  Please follow these basic guidlines.  Use pain medication if needed as prescribed.  Ice packs are a helpful addition to your comfort.  Applying a  waterproof ice pack over a cloth or thin towel to the site for 30 minutes per hour may provide benefit by reducing swelling and discomfort.  Your Physical Therapy/Occupational Therapy may include ambulation, transfers, stairs, ADL's (activities of daily living), range of motion exercises, and isometrics.  Your participation is vital to your recovery.    -Your Activity  • Weight Bearing as tolerated with rolling walker.  • Take short, frequent walks increasing the distance that you walk each day as tolerated.  • Change your position every hour to decrease pain and stiffness.  • Continue the exercises taught to you by your physical therapist.  • No driving until cleared by the doctor.  • No tub baths, hot tubs, or swimming pools until instructed by your doctor.  • Do not squat down on the floor.  • Do not kneel or twist your knee.    Keep incision clean and dry. Change the dressing daily if there is drainage noted from surgical wound. When there is no drainage, the wound may be left open to air.  Salves, ointments or other topical treatments are not to be used on the wound unless specifically recommended by your doctor.   If you have sutures (stiches) and no drainage form the incision you may shower allowing water to rinse the incision and pat it  dry afterward with a clean towel.  If you have Prineo (tape/glue) you may shower and pat the wound dry.  Prineo removal is done in the office 2 weeks after surgery.    If you have further questions or problems call your doctors office.

## 2019-11-05 NOTE — DISCHARGE NOTE PROVIDER - INSTRUCTIONS
For Constipation :   • Increase your water intake. Drink at least 8 glasses of water daily.  • Try adding fiber to your diet by eating fruits, vegetables and foods that are rich in grains.  • If you do experience constipation, you may take an over-the-counter stool softener/laxative such as Marbella Colace, Senekot or  Milk of Magnesia.

## 2019-11-05 NOTE — DISCHARGE NOTE PROVIDER - CARE PROVIDER_API CALL
Anthony Henson)  Orthopaedic Surgery  [address], [address]  [place] [address]  Phone: [phone]  Fax: [phone]  Follow Up Time:

## 2019-11-05 NOTE — DISCHARGE NOTE PROVIDER - NSDCCPCAREPLAN_GEN_ALL_CORE_FT
PRINCIPAL DISCHARGE DIAGNOSIS  Diagnosis: Right knee DJD  Assessment and Plan of Treatment: PRINCIPAL DISCHARGE DIAGNOSIS  Diagnosis: Right knee DJD  Assessment and Plan of Treatment: Physical Therapy/Occupational Therapy for: ambulation, transfers, stairs, ADL's (activities of daily living), range of motion exercises, and isometrics  -Activity  • Weight Bearing as tolerated with rolling walker.  • Take short, frequent walks increasing the distance that you walk each day as tolerated.  • Change your position every hour to decrease pain and stiffness.  • Continue the exercises taught to you by your physical therapist.  • No driving until cleared by the doctor.  • No tub baths, hot tubs, or swimming pools until instructed by your doctor.  • Do not squat down on the floor.  • Do not kneel or twist your knee.  • Range of Motion Goals: Flexion= 120 degrees, Extension = 0 degrees  Keep incision clean and dry. May shower 5 days after surgery if no drainage from incision.  Suture/Prineo removal 2 weeks after surgery at Surgeon's office.

## 2019-11-05 NOTE — DISCHARGE NOTE NURSING/CASE MANAGEMENT/SOCIAL WORK - NSSCNAMETXT_GEN_ALL_CORE
[facility]   Please contact the home care agency at  [phone] if you have not heard from them by 12 noon on the day after your hospital discharge.   Nurse to visit the day after hospital discharge; Rehabilitation Therapists to follow

## 2019-11-05 NOTE — DISCHARGE NOTE NURSING/CASE MANAGEMENT/SOCIAL WORK - CASE MANAGER'S NAME
Office # [phone] Kalie Rincon RNC, [facility]  Direct # [phone]/ Office # [phone]

## 2019-11-05 NOTE — DISCHARGE NOTE PROVIDER - NSDCFUSCHEDAPPT_GEN_ALL_CORE_FT
MISTY EPPERSON ; 11/18/2019 ; [facility]  MISTY EPPERSON ; 01/07/2020 ; [facility] MISTY EPPERSON ; 11/18/2019 ; [facility]  MISTY EPPERSON ; 01/07/2020 ; [facility] MISTY EPPERSON ; 11/18/2019 ; [facility]  MISTY EPPERSON ; 01/07/2020 ; [facility] MISTY EPPERSON ; 11/18/2019 ; [facility]  MISTY EPPERSON ; 01/07/2020 ; [facility]

## 2019-11-05 NOTE — PROGRESS NOTE ADULT - SUBJECTIVE AND OBJECTIVE BOX
PULMONARY/CRITICAL CARE      INTERVAL HPI/OVERNIGHT EVENTS:    59y MaleHPI:    Doing well, less pain, no sob. Wore O2 hs.    PAST MEDICAL & SURGICAL HISTORY:  Motion sickness  Osteoarthritis  Class 3 severe obesity with body mass index (BMI) of 40.0 to 44.9 in adult  DUANE (obstructive sleep apnea): no treatment  S/P ORIF (open reduction internal fixation) fracture: 1994- LEFT Knee  S/P right knee surgery: 1977- ligament repair        ICU Vital Signs Last 24 Hrs  T(C): 36.6 (05 Nov 2019 07:31), Max: 36.8 (04 Nov 2019 19:02)  T(F): 97.8 (05 Nov 2019 07:31), Max: 98.2 (04 Nov 2019 19:02)  HR: 66 (05 Nov 2019 07:31) (59 - 81)  BP: 151/83 (05 Nov 2019 07:31) (129/69 - 151/83)  BP(mean): --  ABP: --  ABP(mean): --  RR: 17 (05 Nov 2019 07:31) (11 - 23)  SpO2: 92% (05 Nov 2019 07:31) (92% - 100%)    Qtts:     I&O's Summary    04 Nov 2019 07:01  -  05 Nov 2019 07:00  --------------------------------------------------------  IN: 1700 mL / OUT: 1450 mL / NET: 250 mL      Physical Examination:    General: No acute distress.  Obese male    HEENT: Pupils equal, reactive to light.  Symmetric.    PULM: Clear to auscultation bilaterally, no significant sputum production    CVS: Regular rate and rhythm, no murmurs, rubs, or gallops    ABD: Soft, nondistended, nontender, normoactive bowel sounds, no masses    EXT: No edema, nontender  Right knee bandaged    SKIN: Warm and well perfused, no rashes noted.    NEURO: Alert, oriented, interactive, nonfocal              LABS:                        13.8   13.23 )-----------( 223      ( 05 Nov 2019 07:15 )             42.2     11-04    136  |  99  |  17  ----------------------------<  147<H>  4.7   |  28  |  1.29    Ca    9.3      04 Nov 2019 17:35            vanco through     RADIOLOGY & ADDITIONAL STUDIES:      CRITICAL CARE TIME SPENT:

## 2019-11-05 NOTE — PROGRESS NOTE ADULT - SUBJECTIVE AND OBJECTIVE BOX
ORTHOPEDIC PA PROGRESS NOTE  MISTY EPPERSON      59y Male                                 SY [id number] [id number]                                                                                                                           POD #    1d    STATUS POST:       Procedure: Right total knee arthroplasty             Patient seen and examined at bedside.      Current Pain Management:    acetaminophen   Tablet .. 1000 milliGRAM(s) Oral every 8 hours  celecoxib 200 milliGRAM(s) Oral every 12 hours  HYDROmorphone  Injectable 0.5 milliGRAM(s) IV Push every 3 hours PRN  ondansetron Injectable 4 milliGRAM(s) IV Push every 6 hours PRN  oxyCODONE    IR 5 milliGRAM(s) Oral every 3 hours PRN  oxyCODONE    IR 10 milliGRAM(s) Oral every 3 hours PRN      T(F): 97.8  HR: 66  BP: 151/83  RR: 17  SpO2: 92%                         13.8   13.23 )-----------( 223      ( 05 Nov 2019 07:15 )             42.2               11-04-19 @ 07:01  -  11-05-19 @ 07:00  --------------------------------------------------------  IN:    IV PiggyBack: 200 mL    lactated ringers.: 1100 mL    lactated ringers.: 400 mL  Total IN: 1700 mL    OUT:    Estimated Blood Loss: 150 mL    Voided: 1300 mL  Total OUT: 1450 mL    Total NET: 250 mL        Physical Exam :    -   Dressing C/D/I.   -   Distal Neurvascular status intact grossly.   -   Warm well perfused; capillary refill <3 seconds   -   (+)EHL/FHL   -   (+) Sensation to light touch  -   (-) Calf tenderness Bilaterally      A/P: 59y Male s/p Right total knee arthroplasty     -   Ortho Stable  -   Pain control:  acetaminophen   Tablet .. 1000 milliGRAM(s) Oral every 8 hours  celecoxib 200 milliGRAM(s) Oral every 12 hours  HYDROmorphone  Injectable 0.5 milliGRAM(s) IV Push every 3 hours PRN  ondansetron Injectable 4 milliGRAM(s) IV Push every 6 hours PRN  oxyCODONE    IR 5 milliGRAM(s) Oral every 3 hours PRN  oxyCODONE    IR 10 milliGRAM(s) Oral every 3 hours PRN    -   Medicine to follow  -   DVT ppx:    PAS +  aspirin enteric coated: 81 milliGRAM(s) Oral  -   PT/OT OOB,  Weight bearing status: WBAT   -  Dispo: Home wed   -   Prescribed Medications:  mupirocin 2% topical ointment: Apply topically to affected area 2 times a day to nostrils. can substitite

## 2019-11-05 NOTE — PROGRESS NOTE ADULT - ASSESSMENT
Pt. stable postop right TKR.  Has obvious sleep apnea, but needs formal sleep study as outpt.   Advised fu with his pulmonologist in [place].

## 2019-11-05 NOTE — PROGRESS NOTE ADULT - PROBLEM SELECTOR PLAN 2
patient has intolerance to CPAP.    remote tele ordered  explained importance of wearing nasal canula and try to keep HOB>30 while sleeping  Pulm consult appreciated

## 2019-11-05 NOTE — PROGRESS NOTE ADULT - PROBLEM SELECTOR PLAN 1
Pain Management: acceptable- continue current care Tylenol ATC/Celebrex ATC/ Oxycodone PRN  Continue PT/OT  DVT proph: [ x ] low risk - Aspirin   DC plan:  [x  ] Home with HC

## 2019-11-05 NOTE — DISCHARGE NOTE PROVIDER - HOSPITAL COURSE
This patient was admitted to [facility] with a history of severe degenerative joint disease of the Right Knee.  Patient went to Pre-Surgical Testing at [facility] and was medically cleared to undergo elective procedure.  No operative or clifford-operative complications arose during patients hospital course.  Patient received antibiotic according to SCIP guidelines for infection prevention.  Ecotrin was given for DVT prophylaxis.  Anesthesia, Medical Hospitalist, Physical Therapy and Occupational Therapy were consulted. Patient is stable for discharge with a good prognosis.  Appropriate discharge instructions and medications are provided in this document.

## 2019-11-05 NOTE — PROGRESS NOTE ADULT - SUBJECTIVE AND OBJECTIVE BOX
Patient is a 59y old  Male who presents with a chief complaint of Right Total Knee Replacement (05 Nov 2019 10:21)    INTERVAL HPI/OVERNIGHT EVENTS: feeling ok, dozed last night, no new complaints. pain is controlled.    MEDICATIONS  (STANDING):  acetaminophen   Tablet .. 1000 milliGRAM(s) Oral every 8 hours  aspirin enteric coated 81 milliGRAM(s) Oral every 12 hours  celecoxib 200 milliGRAM(s) Oral every 12 hours  lactated ringers. 1000 milliLiter(s) (100 mL/Hr) IV Continuous <Continuous>  pantoprazole    Tablet 40 milliGRAM(s) Oral before breakfast  polyethylene glycol 3350 17 Gram(s) Oral daily    MEDICATIONS  (PRN):  aluminum hydroxide/magnesium hydroxide/simethicone Suspension 30 milliLiter(s) Oral four times a day PRN Indigestion  HYDROmorphone  Injectable 0.5 milliGRAM(s) IV Push every 3 hours PRN Severe Pain (7 - 10)  magnesium hydroxide Suspension 30 milliLiter(s) Oral daily PRN Constipation  ondansetron Injectable 4 milliGRAM(s) IV Push every 6 hours PRN Nausea and/or Vomiting  oxyCODONE    IR 5 milliGRAM(s) Oral every 3 hours PRN Mild Pain (1 - 3)  oxyCODONE    IR 10 milliGRAM(s) Oral every 3 hours PRN Moderate Pain (4 - 6)  senna 2 Tablet(s) Oral at bedtime PRN Constipation    Allergies  No Known Allergies    REVIEW OF SYSTEMS:  CONSTITUTIONAL: No fever, weight loss, or fatigue  EYES: No eye pain, visual disturbances, or discharge  ENMT:  No difficulty hearing, tinnitus, vertigo; No sinus or throat pain  NECK: No pain or stiffness  BREASTS: No pain, masses, or nipple discharge  RESPIRATORY: No cough, wheezing, chills or hemoptysis; No shortness of breath  CARDIOVASCULAR: No chest pain, palpitations, or lightheadedness  GASTROINTESTINAL: No abdominal or epigastric pain. No nausea, vomiting, or hematemesis; No diarrhea or constipation. No melena or hematochezia.  GENITOURINARY: No dysuria, frequency, hematuria, or incontinence  NEUROLOGICAL: No headaches, vertigo, memory loss, loss of strength, numbness, or tremors  SKIN: No itching, burning, rashes, or lesions   LYMPH NODES: No enlarged glands  ENDOCRINE: No heat or cold intolerance; No hair loss; No polydipsia or polyuria  MUSCULOSKELETAL: No back pain  PSYCHIATRIC: No depression, anxiety, or mood swings  HEME/LYMPH: No easy bruising, or bleeding gums  ALLERGY AND IMMUNOLOGIC: No hives or eczema    Vital Signs Last 24 Hrs  T(C): 36.6 (05 Nov 2019 07:31), Max: 36.8 (04 Nov 2019 19:02)  T(F): 97.8 (05 Nov 2019 07:31), Max: 98.2 (04 Nov 2019 19:02)  HR: 66 (05 Nov 2019 07:31) (59 - 81)  BP: 151/83 (05 Nov 2019 07:31) (129/69 - 151/83)  BP(mean): --  RR: 17 (05 Nov 2019 07:31) (16 - 22)  SpO2: 92% (05 Nov 2019 07:31) (92% - 100%)    PHYSICAL EXAM:  GENERAL: NAD, well-groomed, well-developed  HEAD:  Atraumatic, Normocephalic  EYES: conjunctiva and sclera clear  ENMT: Moist mucous membranes  NECK: Supple, No JVD  NERVOUS SYSTEM:  Alert & Oriented X3, Good concentration; Bilateral LE mobile, sensation to light touch intact  CHEST/LUNG: Clear to auscultation bilaterally; No rales, rhonchi, wheezing, or rubs  HEART: Regular rate and rhythm; No murmurs, rubs, or gallops  ABDOMEN: Soft, Nontender, Nondistended; Bowel sounds present  EXTREMITIES:  2+ Peripheral Pulses, No clubbing or cyanosis  LYMPH: No lymphadenopathy noted  SKIN: No rashes or lesions  INCISION:  Dressing dry and intact    LABS:                        13.8   13.23 )-----------( 223      ( 05 Nov 2019 07:15 )             42.2     05 Nov 2019 07:15    133    |  98     |  19     ----------------------------<  110    4.3     |  22     |  1.18     Ca    9.0        05 Nov 2019 07:15          CAPILLARY BLOOD GLUCOSE          RADIOLOGY & ADDITIONAL TESTS:    Imaging Personally Reviewed:      [ ] Consultant(s) Notes Reviewed  [x] Care Discussed with Consultants/Other Providers:  Ortho PA- plan of care

## 2019-11-05 NOTE — DISCHARGE NOTE PROVIDER - NSDCMRMEDTOKEN_GEN_ALL_CORE_FT
ibuprofen 200 mg oral capsule: 2 cap(s) orally once a day, As Needed  mupirocin 2% topical ointment: Apply topically to affected area 2 times a day to nostrils. can substitite 3:1 Commode: Dx: s/p Right TKR  acetaminophen 500 mg oral tablet: 2 tab(s) orally every 8 hours  aspirin 81 mg oral delayed release tablet: 1 tab orally every 12 hours. Take 2 hours before Celebrex  celecoxib 200 mg oral capsule: 1 cap orally every 12 hours. Take 2 hours after aspirin.   mupirocin 2% topical ointment: Apply topically to affected area 2 times a day to nostrils. can substitite   oxyCODONE 5 mg oral tablet: 1 tab orally every 4 hours, As Needed for moderate pain, 2 tabs orally every 4 hours for severe pain MDD:6  pantoprazole 40 mg oral delayed release tablet: 1 tab orally once a day (before a meal)  polyethylene glycol 3350 oral powder for reconstitution: 17 gram(s) orally once a day  Rolling Walker with 5 inch wheels: Dx: s/p Right TKR  senna oral tablet: 2 tab(s) orally once a day (at bedtime), As needed, Constipation 3:1 Commode: Dx: s/p Right TKR  acetaminophen 500 mg oral tablet: 2 tab(s) orally every 8 hours  aspirin 81 mg oral delayed release tablet: 1 tab orally every 12 hours. Take 2 hours before Celebrex  celecoxib 200 mg oral capsule: 1 cap orally every 12 hours. Take 2 hours after aspirin.   cpm: Dx: right total knee replacement,ICD:Z96.651 ,VASU:apply for at least 2 weeks.CPM start date:11/7/2019,Apply 2 times a day for 2 hour sessions ,start 0-60 degrees,advance 5-10 degrees each session as tolerated, max 110 degrees.   lidocaine 5% topical film: Apply topically to affected area once a day  pantoprazole 40 mg oral delayed release tablet: 1 tab orally once a day (before a meal)  polyethylene glycol 3350 oral powder for reconstitution: 17 gram(s) orally once a day  Rolling Walker with 5 inch wheels: Dx: s/p Right TKR  senna oral tablet: 2 tab(s) orally once a day (at bedtime), As needed, Constipation  traMADol 50 mg oral tablet: 1 tab every 4 hours , as needed for mild/moderate pain  2 tab(s) orally every 6 hours, As needed, Severe Pain MDD:8

## 2019-11-06 LAB
ANION GAP SERPL CALC-SCNC: 6 MMOL/L — SIGNIFICANT CHANGE UP (ref 5–17)
BUN SERPL-MCNC: 15 MG/DL — SIGNIFICANT CHANGE UP (ref 7–23)
CALCIUM SERPL-MCNC: 9.1 MG/DL — SIGNIFICANT CHANGE UP (ref 8.4–10.5)
CHLORIDE SERPL-SCNC: 98 MMOL/L — SIGNIFICANT CHANGE UP (ref 96–108)
CO2 SERPL-SCNC: 32 MMOL/L — HIGH (ref 22–31)
CREAT SERPL-MCNC: 1.02 MG/DL — SIGNIFICANT CHANGE UP (ref 0.5–1.3)
GLUCOSE SERPL-MCNC: 113 MG/DL — HIGH (ref 70–99)
HCT VFR BLD CALC: 41.4 % — SIGNIFICANT CHANGE UP (ref 39–50)
HGB BLD-MCNC: 13.4 G/DL — SIGNIFICANT CHANGE UP (ref 13–17)
MCHC RBC-ENTMCNC: 27.5 PG — SIGNIFICANT CHANGE UP (ref 27–34)
MCHC RBC-ENTMCNC: 32.4 GM/DL — SIGNIFICANT CHANGE UP (ref 32–36)
MCV RBC AUTO: 85 FL — SIGNIFICANT CHANGE UP (ref 80–100)
NRBC # BLD: 0 /100 WBCS — SIGNIFICANT CHANGE UP (ref 0–0)
PLATELET # BLD AUTO: 187 K/UL — SIGNIFICANT CHANGE UP (ref 150–400)
POTASSIUM SERPL-MCNC: 4 MMOL/L — SIGNIFICANT CHANGE UP (ref 3.5–5.3)
POTASSIUM SERPL-SCNC: 4 MMOL/L — SIGNIFICANT CHANGE UP (ref 3.5–5.3)
RBC # BLD: 4.87 M/UL — SIGNIFICANT CHANGE UP (ref 4.2–5.8)
RBC # FLD: 13.2 % — SIGNIFICANT CHANGE UP (ref 10.3–14.5)
SODIUM SERPL-SCNC: 136 MMOL/L — SIGNIFICANT CHANGE UP (ref 135–145)
WBC # BLD: 10.08 K/UL — SIGNIFICANT CHANGE UP (ref 3.8–10.5)
WBC # FLD AUTO: 10.08 K/UL — SIGNIFICANT CHANGE UP (ref 3.8–10.5)

## 2019-11-06 PROCEDURE — 99232 SBSQ HOSP IP/OBS MODERATE 35: CPT

## 2019-11-06 RX ORDER — LIDOCAINE 4 G/100G
1 CREAM TOPICAL DAILY
Refills: 0 | Status: DISCONTINUED | OUTPATIENT
Start: 2019-11-06 | End: 2019-11-07

## 2019-11-06 RX ORDER — HYDROMORPHONE HYDROCHLORIDE 2 MG/ML
6 INJECTION INTRAMUSCULAR; INTRAVENOUS; SUBCUTANEOUS
Refills: 0 | Status: DISCONTINUED | OUTPATIENT
Start: 2019-11-06 | End: 2019-11-07

## 2019-11-06 RX ORDER — HYDROMORPHONE HYDROCHLORIDE 2 MG/ML
4 INJECTION INTRAMUSCULAR; INTRAVENOUS; SUBCUTANEOUS
Refills: 0 | Status: DISCONTINUED | OUTPATIENT
Start: 2019-11-06 | End: 2019-11-07

## 2019-11-06 RX ADMIN — CELECOXIB 200 MILLIGRAM(S): 200 CAPSULE ORAL at 09:25

## 2019-11-06 RX ADMIN — OXYCODONE HYDROCHLORIDE 10 MILLIGRAM(S): 5 TABLET ORAL at 00:21

## 2019-11-06 RX ADMIN — PANTOPRAZOLE SODIUM 40 MILLIGRAM(S): 20 TABLET, DELAYED RELEASE ORAL at 05:06

## 2019-11-06 RX ADMIN — OXYCODONE HYDROCHLORIDE 10 MILLIGRAM(S): 5 TABLET ORAL at 06:00

## 2019-11-06 RX ADMIN — LIDOCAINE 1 PATCH: 4 CREAM TOPICAL at 16:41

## 2019-11-06 RX ADMIN — Medication 81 MILLIGRAM(S): at 16:42

## 2019-11-06 RX ADMIN — Medication 1000 MILLIGRAM(S): at 16:37

## 2019-11-06 RX ADMIN — OXYCODONE HYDROCHLORIDE 10 MILLIGRAM(S): 5 TABLET ORAL at 09:28

## 2019-11-06 RX ADMIN — CELECOXIB 200 MILLIGRAM(S): 200 CAPSULE ORAL at 09:29

## 2019-11-06 RX ADMIN — LIDOCAINE 1 PATCH: 4 CREAM TOPICAL at 19:06

## 2019-11-06 RX ADMIN — OXYCODONE HYDROCHLORIDE 10 MILLIGRAM(S): 5 TABLET ORAL at 06:32

## 2019-11-06 RX ADMIN — Medication 1000 MILLIGRAM(S): at 23:35

## 2019-11-06 RX ADMIN — Medication 1000 MILLIGRAM(S): at 16:42

## 2019-11-06 RX ADMIN — Medication 1000 MILLIGRAM(S): at 09:25

## 2019-11-06 RX ADMIN — Medication 81 MILLIGRAM(S): at 05:06

## 2019-11-06 RX ADMIN — CELECOXIB 200 MILLIGRAM(S): 200 CAPSULE ORAL at 21:55

## 2019-11-06 RX ADMIN — OXYCODONE HYDROCHLORIDE 10 MILLIGRAM(S): 5 TABLET ORAL at 10:00

## 2019-11-06 RX ADMIN — Medication 1000 MILLIGRAM(S): at 09:29

## 2019-11-06 RX ADMIN — HYDROMORPHONE HYDROCHLORIDE 6 MILLIGRAM(S): 2 INJECTION INTRAMUSCULAR; INTRAVENOUS; SUBCUTANEOUS at 23:35

## 2019-11-06 RX ADMIN — OXYCODONE HYDROCHLORIDE 10 MILLIGRAM(S): 5 TABLET ORAL at 00:55

## 2019-11-06 NOTE — PROGRESS NOTE ADULT - SUBJECTIVE AND OBJECTIVE BOX
Patient is a 59y old  Male who presents with a chief complaint of Right Total Knee Replacement (05 Nov 2019 10:21)    INTERVAL HPI/OVERNIGHT EVENTS: was feeling well in bed.  when he got up with PT was a bit lightheaded.     MEDICATIONS  (STANDING):  acetaminophen   Tablet .. 1000 milliGRAM(s) Oral every 8 hours  aspirin enteric coated 81 milliGRAM(s) Oral every 12 hours  celecoxib 200 milliGRAM(s) Oral every 12 hours  lactated ringers. 1000 milliLiter(s) (100 mL/Hr) IV Continuous <Continuous>  pantoprazole    Tablet 40 milliGRAM(s) Oral before breakfast  polyethylene glycol 3350 17 Gram(s) Oral daily    MEDICATIONS  (PRN):  aluminum hydroxide/magnesium hydroxide/simethicone Suspension 30 milliLiter(s) Oral four times a day PRN Indigestion  bisacodyl Suppository 10 milliGRAM(s) Rectal daily PRN If no bowel movement by postoperative day #2  HYDROmorphone   Tablet 4 milliGRAM(s) Oral every 3 hours PRN Mild Pain (1 - 3)  HYDROmorphone   Tablet 6 milliGRAM(s) Oral every 3 hours PRN Moderate Pain (4 - 6)  HYDROmorphone  Injectable 0.5 milliGRAM(s) IV Push every 3 hours PRN Severe Pain (7 - 10)  magnesium hydroxide Suspension 30 milliLiter(s) Oral daily PRN Constipation  ondansetron Injectable 4 milliGRAM(s) IV Push every 6 hours PRN Nausea and/or Vomiting  senna 2 Tablet(s) Oral at bedtime PRN Constipation    Allergies  No Known Allergies    REVIEW OF SYSTEMS:  CONSTITUTIONAL: No fever, weight loss, or fatigue  EYES: No eye pain, visual disturbances, or discharge  ENMT:  No difficulty hearing, tinnitus, vertigo; No sinus or throat pain  NECK: No pain or stiffness  BREASTS: No pain, masses, or nipple discharge  RESPIRATORY: No cough, wheezing, chills or hemoptysis; No shortness of breath  CARDIOVASCULAR: No chest pain, palpitations, or lightheadedness  GASTROINTESTINAL: No abdominal or epigastric pain. No nausea, vomiting, or hematemesis; No diarrhea or constipation. No melena or hematochezia.  GENITOURINARY: No dysuria, frequency, hematuria, or incontinence  NEUROLOGICAL: No headaches, vertigo, memory loss, loss of strength, numbness, or tremors  SKIN: No itching, burning, rashes, or lesions   LYMPH NODES: No enlarged glands  ENDOCRINE: No heat or cold intolerance; No hair loss; No polydipsia or polyuria  MUSCULOSKELETAL: No back pain  PSYCHIATRIC: No depression, anxiety, or mood swings  HEME/LYMPH: No easy bruising, or bleeding gums  ALLERGY AND IMMUNOLOGIC: No hives or eczema    Vital Signs Last 24 Hrs  T(C): 36.8 (06 Nov 2019 07:42), Max: 37.2 (06 Nov 2019 02:48)  T(F): 98.3 (06 Nov 2019 07:42), Max: 98.9 (06 Nov 2019 02:48)  HR: 79 (06 Nov 2019 10:31) (63 - 82)  BP: 98/54 (06 Nov 2019 10:31) (98/54 - 165/76)  BP(mean): --  RR: 18 (06 Nov 2019 09:22) (17 - 18)  SpO2: 93% (06 Nov 2019 09:22) (92% - 97%)    PHYSICAL EXAM:  GENERAL: NAD, well-groomed, well-developed  HEAD:  Atraumatic, Normocephalic  EYES:  conjunctiva and sclera clear  ENMT: Moist mucous membranes  NECK: Supple, No JVD  NERVOUS SYSTEM:  Alert & Oriented X3, Good concentration; Bilateral LE mobile, sensation to light touch intact  CHEST/LUNG: Clear to auscultation bilaterally; No rales, rhonchi, wheezing, or rubs  HEART: Regular rate and rhythm; No murmurs, rubs, or gallops  ABDOMEN: Soft, Nontender, Nondistended; Bowel sounds present  EXTREMITIES:  2+ Peripheral Pulses, No clubbing or cyanosis  LYMPH: No lymphadenopathy noted  SKIN: No rashes or lesions  INCISION:  Dressing dry and intact    LABS:                        13.4   10.08 )-----------( 187      ( 06 Nov 2019 07:15 )             41.4     06 Nov 2019 07:15    136    |  98     |  15     ----------------------------<  113    4.0     |  32     |  1.02     Ca    9.1        06 Nov 2019 07:15          CAPILLARY BLOOD GLUCOSE          RADIOLOGY & ADDITIONAL TESTS:    Imaging Personally Reviewed:      [ ] Consultant(s) Notes Reviewed  [x] Care Discussed with Consultants/Other Providers:  Ortho PA- plan of care

## 2019-11-06 NOTE — PROGRESS NOTE ADULT - SUBJECTIVE AND OBJECTIVE BOX
Post Op Day # 2    SUBJECTIVE    60yo Male status post right TKR .   Patient is alert and comfortable, however he continues to have pain despite oxycodone. Patient switched to dilaudid panel this morning. He had a near vasovagal episode this morning while ambulating with PT due to severe pain. Otherwise denies nausea, vomiting, chest pain, shortness of breath, abdominal pain or fever. No new complaints.    OBJECTIVE    Vital Signs Last 24 Hrs  T(C): 36.6 (06 Nov 2019 11:13), Max: 37.2 (06 Nov 2019 02:48)  T(F): 97.8 (06 Nov 2019 11:13), Max: 98.9 (06 Nov 2019 02:48)  HR: 72 (06 Nov 2019 11:13) (63 - 82)  BP: 150/83 (06 Nov 2019 11:13) (98/54 - 165/76)  BP(mean): --  RR: 18 (06 Nov 2019 11:13) (17 - 18)  SpO2: 97% (06 Nov 2019 11:13) (93% - 97%)  I&O's Summary    05 Nov 2019 07:01  -  06 Nov 2019 07:00  --------------------------------------------------------  IN: 0 mL / OUT: 700 mL / NET: -700 mL        PHYSICAL EXAM    Right knee incision  is clean, dry and intact.   No erythema/ No exudate/ No blistering/ No ecchymosis.   The calf is supple/nontender.   Sensation to light touch is grossly intact distally.   Motor function distally is intact. TA/GS/EHL 5/5 bilaterally  No foot drop.   (2+) dorsalis pedis pulse. Capillary refill is less than 2 seconds. No cyanosis.                          13.4   10.08 )-----------( 187      ( 06 Nov 2019 07:15 )             41.4   06 Nov 2019 07:15                        13.8   13.23<H> )-----------( 223      ( 05 Nov 2019 07:15 )             42.2   05 Nov 2019 07:15    06 Nov 2019 07:15    136    |  98     |  15     ----------------------------<  113<H>  4.0     |  32<H>  |  1.02   05 Nov 2019 07:15    133<L>  |  98     |  19     ----------------------------<  110<H>  4.3     |  22     |  1.18     Ca    9.1        06 Nov 2019 07:15  Ca    9.0        05 Nov 2019 07:15        ASSESSMENT AND PLAN  - Orthopedically stable  - DVT prophylaxis: PAS + Aspirin 81mg BID   - Continue physical therapy and occupational therapy  - Weight bearing as tolerated of the right lower extremity with assistance of a walker  - Incentive spirometry encouraged  - Pain control as clinically indicated. Switched to dilaudid, monitor for improvement  - Disposition:  Home when cleared by PT/OT and medical team

## 2019-11-06 NOTE — PROGRESS NOTE ADULT - ASSESSMENT
Pt. stable postop right TKR.  Overall doing well  Has obvious sleep apnea, but needs formal sleep study as outpt.   Advised fu with his pulmonologist in [place].

## 2019-11-06 NOTE — PROGRESS NOTE ADULT - PROBLEM SELECTOR PLAN 2
patient has intolerance to CPAP.    remote tele ordered  explained importance of wearing nasal canula and try to keep HOB>30 while sleeping  Pulm follow up appreciated

## 2019-11-06 NOTE — PROGRESS NOTE ADULT - PROBLEM SELECTOR PLAN 1
Pain Management: acceptable- continue current care Tylenol ATC/Celebrex ATC/ Dilaudid PRN  Continue PT/OT  DVT proph: [ x ] low risk - Aspirin   DC plan:  [x  ] Home with HC when cleared by PT  encourage oral fluid intake.

## 2019-11-06 NOTE — PROGRESS NOTE ADULT - SUBJECTIVE AND OBJECTIVE BOX
PULMONARY/CRITICAL CARE      INTERVAL HPI/OVERNIGHT EVENTS:    59y MaleHPI:  Doing well, almbulating. No sob. Less pain.       PAST MEDICAL & SURGICAL HISTORY:  Motion sickness  Osteoarthritis  Class 3 severe obesity with body mass index (BMI) of 40.0 to 44.9 in adult  DUANE (obstructive sleep apnea): no treatment  S/P ORIF (open reduction internal fixation) fracture: 1994- LEFT Knee  S/P right knee surgery: 1977- ligament repair        ICU Vital Signs Last 24 Hrs  T(C): 36.8 (06 Nov 2019 07:42), Max: 37.2 (06 Nov 2019 02:48)  T(F): 98.3 (06 Nov 2019 07:42), Max: 98.9 (06 Nov 2019 02:48)  HR: 76 (06 Nov 2019 07:42) (63 - 76)  BP: 156/85 (06 Nov 2019 07:42) (139/78 - 165/76)  BP(mean): --  ABP: --  ABP(mean): --  RR: 17 (06 Nov 2019 07:42) (17 - 18)  SpO2: 95% (06 Nov 2019 07:42) (92% - 97%)    Qtts:     I&O's Summary    05 Nov 2019 07:01  -  06 Nov 2019 07:00  --------------------------------------------------------  IN: 0 mL / OUT: 700 mL / NET: -700 mL          Physical Examination:    General: No acute distress.  Obese male    HEENT: Pupils equal, reactive to light.  Symmetric.    PULM: Clear to auscultation bilaterally, no significant sputum production    CVS: Regular rate and rhythm, no murmurs, rubs, or gallops    ABD: Soft, nondistended, nontender, normoactive bowel sounds, no masses    EXT: No edema, nontender  Right knee bandaged    SKIN: Warm and well perfused, no rashes noted.    NEURO: Alert, oriented, interactive, nonfocal            LABS:                        13.4   10.08 )-----------( 187      ( 06 Nov 2019 07:15 )             41.4     11-06    136  |  98  |  15  ----------------------------<  113<H>  4.0   |  32<H>  |  1.02    Ca    9.1      06 Nov 2019 07:15            vanco through     RADIOLOGY & ADDITIONAL STUDIES:      CRITICAL CARE TIME SPENT:

## 2019-11-07 VITALS
RESPIRATION RATE: 16 BRPM | TEMPERATURE: 98 F | DIASTOLIC BLOOD PRESSURE: 75 MMHG | OXYGEN SATURATION: 98 % | SYSTOLIC BLOOD PRESSURE: 120 MMHG | HEART RATE: 88 BPM

## 2019-11-07 LAB
ANION GAP SERPL CALC-SCNC: 5 MMOL/L — SIGNIFICANT CHANGE UP (ref 5–17)
BUN SERPL-MCNC: 15 MG/DL — SIGNIFICANT CHANGE UP (ref 7–23)
CALCIUM SERPL-MCNC: 9.1 MG/DL — SIGNIFICANT CHANGE UP (ref 8.4–10.5)
CHLORIDE SERPL-SCNC: 100 MMOL/L — SIGNIFICANT CHANGE UP (ref 96–108)
CO2 SERPL-SCNC: 30 MMOL/L — SIGNIFICANT CHANGE UP (ref 22–31)
CREAT SERPL-MCNC: 1.04 MG/DL — SIGNIFICANT CHANGE UP (ref 0.5–1.3)
GLUCOSE SERPL-MCNC: 108 MG/DL — HIGH (ref 70–99)
HCT VFR BLD CALC: 39.2 % — SIGNIFICANT CHANGE UP (ref 39–50)
HGB BLD-MCNC: 12.7 G/DL — LOW (ref 13–17)
MCHC RBC-ENTMCNC: 27.6 PG — SIGNIFICANT CHANGE UP (ref 27–34)
MCHC RBC-ENTMCNC: 32.4 GM/DL — SIGNIFICANT CHANGE UP (ref 32–36)
MCV RBC AUTO: 85.2 FL — SIGNIFICANT CHANGE UP (ref 80–100)
NRBC # BLD: 0 /100 WBCS — SIGNIFICANT CHANGE UP (ref 0–0)
PLATELET # BLD AUTO: 188 K/UL — SIGNIFICANT CHANGE UP (ref 150–400)
POTASSIUM SERPL-MCNC: 4.1 MMOL/L — SIGNIFICANT CHANGE UP (ref 3.5–5.3)
POTASSIUM SERPL-SCNC: 4.1 MMOL/L — SIGNIFICANT CHANGE UP (ref 3.5–5.3)
RBC # BLD: 4.6 M/UL — SIGNIFICANT CHANGE UP (ref 4.2–5.8)
RBC # FLD: 13.1 % — SIGNIFICANT CHANGE UP (ref 10.3–14.5)
SODIUM SERPL-SCNC: 135 MMOL/L — SIGNIFICANT CHANGE UP (ref 135–145)
WBC # BLD: 10.16 K/UL — SIGNIFICANT CHANGE UP (ref 3.8–10.5)
WBC # FLD AUTO: 10.16 K/UL — SIGNIFICANT CHANGE UP (ref 3.8–10.5)

## 2019-11-07 PROCEDURE — 97161 PT EVAL LOW COMPLEX 20 MIN: CPT

## 2019-11-07 PROCEDURE — C1776: CPT

## 2019-11-07 PROCEDURE — 97165 OT EVAL LOW COMPLEX 30 MIN: CPT

## 2019-11-07 PROCEDURE — 93970 EXTREMITY STUDY: CPT | Mod: 26

## 2019-11-07 PROCEDURE — 85018 HEMOGLOBIN: CPT

## 2019-11-07 PROCEDURE — 88311 DECALCIFY TISSUE: CPT

## 2019-11-07 PROCEDURE — 73562 X-RAY EXAM OF KNEE 3: CPT

## 2019-11-07 PROCEDURE — 93970 EXTREMITY STUDY: CPT

## 2019-11-07 PROCEDURE — 80048 BASIC METABOLIC PNL TOTAL CA: CPT

## 2019-11-07 PROCEDURE — C1889: CPT

## 2019-11-07 PROCEDURE — 88305 TISSUE EXAM BY PATHOLOGIST: CPT

## 2019-11-07 PROCEDURE — 99239 HOSP IP/OBS DSCHRG MGMT >30: CPT

## 2019-11-07 PROCEDURE — 97535 SELF CARE MNGMENT TRAINING: CPT

## 2019-11-07 PROCEDURE — 97110 THERAPEUTIC EXERCISES: CPT

## 2019-11-07 PROCEDURE — 36415 COLL VENOUS BLD VENIPUNCTURE: CPT

## 2019-11-07 PROCEDURE — C1713: CPT

## 2019-11-07 PROCEDURE — 85014 HEMATOCRIT: CPT

## 2019-11-07 PROCEDURE — 97116 GAIT TRAINING THERAPY: CPT

## 2019-11-07 PROCEDURE — 97530 THERAPEUTIC ACTIVITIES: CPT

## 2019-11-07 PROCEDURE — 97163 PT EVAL HIGH COMPLEX 45 MIN: CPT

## 2019-11-07 PROCEDURE — 85027 COMPLETE CBC AUTOMATED: CPT

## 2019-11-07 RX ORDER — LIDOCAINE 4 G/100G
1 CREAM TOPICAL
Qty: 0 | Refills: 0 | DISCHARGE
Start: 2019-11-07

## 2019-11-07 RX ORDER — TRAMADOL HYDROCHLORIDE 50 MG/1
100 TABLET ORAL EVERY 6 HOURS
Refills: 0 | Status: DISCONTINUED | OUTPATIENT
Start: 2019-11-07 | End: 2019-11-07

## 2019-11-07 RX ORDER — TRAMADOL HYDROCHLORIDE 50 MG/1
50 TABLET ORAL EVERY 4 HOURS
Refills: 0 | Status: DISCONTINUED | OUTPATIENT
Start: 2019-11-07 | End: 2019-11-07

## 2019-11-07 RX ORDER — TRAMADOL HYDROCHLORIDE 50 MG/1
2 TABLET ORAL
Qty: 56 | Refills: 0
Start: 2019-11-07 | End: 2019-11-13

## 2019-11-07 RX ADMIN — LIDOCAINE 1 PATCH: 4 CREAM TOPICAL at 19:08

## 2019-11-07 RX ADMIN — CELECOXIB 200 MILLIGRAM(S): 200 CAPSULE ORAL at 08:44

## 2019-11-07 RX ADMIN — Medication 1000 MILLIGRAM(S): at 09:37

## 2019-11-07 RX ADMIN — LIDOCAINE 1 PATCH: 4 CREAM TOPICAL at 12:59

## 2019-11-07 RX ADMIN — Medication 81 MILLIGRAM(S): at 18:13

## 2019-11-07 RX ADMIN — Medication 1000 MILLIGRAM(S): at 18:17

## 2019-11-07 RX ADMIN — PANTOPRAZOLE SODIUM 40 MILLIGRAM(S): 20 TABLET, DELAYED RELEASE ORAL at 05:27

## 2019-11-07 RX ADMIN — LIDOCAINE 1 PATCH: 4 CREAM TOPICAL at 04:26

## 2019-11-07 RX ADMIN — Medication 1000 MILLIGRAM(S): at 08:45

## 2019-11-07 RX ADMIN — TRAMADOL HYDROCHLORIDE 100 MILLIGRAM(S): 50 TABLET ORAL at 09:13

## 2019-11-07 RX ADMIN — TRAMADOL HYDROCHLORIDE 100 MILLIGRAM(S): 50 TABLET ORAL at 08:43

## 2019-11-07 RX ADMIN — TRAMADOL HYDROCHLORIDE 100 MILLIGRAM(S): 50 TABLET ORAL at 14:35

## 2019-11-07 RX ADMIN — TRAMADOL HYDROCHLORIDE 100 MILLIGRAM(S): 50 TABLET ORAL at 14:05

## 2019-11-07 RX ADMIN — CELECOXIB 200 MILLIGRAM(S): 200 CAPSULE ORAL at 09:01

## 2019-11-07 RX ADMIN — HYDROMORPHONE HYDROCHLORIDE 6 MILLIGRAM(S): 2 INJECTION INTRAMUSCULAR; INTRAVENOUS; SUBCUTANEOUS at 00:07

## 2019-11-07 RX ADMIN — Medication 81 MILLIGRAM(S): at 05:27

## 2019-11-07 NOTE — PROGRESS NOTE ADULT - ASSESSMENT
Pt. stable postop right TKR.  Some dizziness while walking likely due to pain meds  Will order venous doppler legs due to continued discomfort and stiffness.  Advised drink fluids and avoid narcotics if possible.   Has obvious sleep apnea, but needs formal sleep study as outpt.   Advised fu with his pulmonologist in [place].

## 2019-11-07 NOTE — PROGRESS NOTE ADULT - SUBJECTIVE AND OBJECTIVE BOX
Patient is a 59y old  Male who presents with a chief complaint of Right Total Knee Replacement (05 Nov 2019 10:21)    INTERVAL HPI/OVERNIGHT EVENTS: patient getting lightheaded from dilaudid - switched to tramadol.  as per PT/Ot not bending leg enough -started on CPM.     MEDICATIONS  (STANDING):  acetaminophen   Tablet .. 1000 milliGRAM(s) Oral every 8 hours  aspirin enteric coated 81 milliGRAM(s) Oral every 12 hours  celecoxib 200 milliGRAM(s) Oral every 12 hours  lactated ringers. 1000 milliLiter(s) (100 mL/Hr) IV Continuous <Continuous>  lidocaine   Patch 1 Patch Transdermal daily  pantoprazole    Tablet 40 milliGRAM(s) Oral before breakfast  polyethylene glycol 3350 17 Gram(s) Oral daily    MEDICATIONS  (PRN):  aluminum hydroxide/magnesium hydroxide/simethicone Suspension 30 milliLiter(s) Oral four times a day PRN Indigestion  bisacodyl Suppository 10 milliGRAM(s) Rectal daily PRN If no bowel movement by postoperative day #2  HYDROmorphone  Injectable 0.5 milliGRAM(s) IV Push every 3 hours PRN Severe Pain (7 - 10)  magnesium hydroxide Suspension 30 milliLiter(s) Oral daily PRN Constipation  ondansetron Injectable 4 milliGRAM(s) IV Push every 6 hours PRN Nausea and/or Vomiting  senna 2 Tablet(s) Oral at bedtime PRN Constipation  traMADol 50 milliGRAM(s) Oral every 4 hours PRN Mild Pain (1 - 3)  traMADol 100 milliGRAM(s) Oral every 6 hours PRN Moderate Pain (4 - 6)      Allergies  No Known Allergies    REVIEW OF SYSTEMS:  CONSTITUTIONAL: No fever, weight loss, or fatigue  EYES: No eye pain, visual disturbances, or discharge  ENMT:  No difficulty hearing, tinnitus, vertigo; No sinus or throat pain  NECK: No pain or stiffness  BREASTS: No pain, masses, or nipple discharge  RESPIRATORY: No cough, wheezing, chills or hemoptysis; No shortness of breath  CARDIOVASCULAR: No chest pain, palpitations, or lightheadedness  GASTROINTESTINAL: No abdominal or epigastric pain. No nausea, vomiting, or hematemesis; No diarrhea or constipation. No melena or hematochezia.  GENITOURINARY: No dysuria, frequency, hematuria, or incontinence  NEUROLOGICAL: No headaches, vertigo, memory loss, loss of strength, numbness, or tremors  SKIN: No itching, burning, rashes, or lesions   LYMPH NODES: No enlarged glands  ENDOCRINE: No heat or cold intolerance; No hair loss; No polydipsia or polyuria  MUSCULOSKELETAL: No back pain  PSYCHIATRIC: No depression, anxiety, or mood swings  HEME/LYMPH: No easy bruising, or bleeding gums  ALLERGY AND IMMUNOLOGIC: No hives or eczema    Vital Signs Last 24 Hrs  T(C): 36.7 (07 Nov 2019 07:57), Max: 37.2 (06 Nov 2019 23:30)  T(F): 98 (07 Nov 2019 07:57), Max: 99 (06 Nov 2019 23:30)  HR: 78 (07 Nov 2019 07:57) (70 - 85)  BP: 130/75 (07 Nov 2019 07:57) (130/75 - 134/76)  BP(mean): --  RR: 16 (07 Nov 2019 07:57) (16 - 18)  SpO2: 96% (07 Nov 2019 07:57) (93% - 96%)    PHYSICAL EXAM:  GENERAL: NAD, well-groomed, well-developed  HEAD:  Atraumatic, Normocephalic  EYES:  conjunctiva and sclera clear  ENMT: Moist mucous membranes,   NECK: Supple, No JVD  NERVOUS SYSTEM:  Alert & Oriented X3, Good concentration; Bilateral LE mobile, sensation to light touch intact  CHEST/LUNG: Clear to auscultation bilaterally; No rales, rhonchi, wheezing, or rubs  HEART: Regular rate and rhythm; No murmurs, rubs, or gallops  ABDOMEN: Soft, Nontender, Nondistended; Bowel sounds present  EXTREMITIES:  2+ Peripheral Pulses, No clubbing or cyanosis  LYMPH: No lymphadenopathy noted  SKIN: No rashes or lesions  INCISION:  no C/D/I    LABS:                        12.7   10.16 )-----------( 188      ( 07 Nov 2019 07:12 )             39.2     07 Nov 2019 07:12    135    |  100    |  15     ----------------------------<  108    4.1     |  30     |  1.04     Ca    9.1        07 Nov 2019 07:12      CAPILLARY BLOOD GLUCOSE    RADIOLOGY & ADDITIONAL TESTS:    Imaging Personally Reviewed:    Doppler - no DVT     [ ] Consultant(s) Notes Reviewed  [x] Care Discussed with Consultants/Other Providers:  Ortho PA- plan of care

## 2019-11-07 NOTE — PROGRESS NOTE ADULT - SUBJECTIVE AND OBJECTIVE BOX
PULMONARY/CRITICAL CARE      INTERVAL HPI/OVERNIGHT EVENTS:    59y MaleHPI:    Had episode dizziness while walking after taking pain meds. No chest pain, sob.  Stiffness right leg.  Feels otherwise well now.    PAST MEDICAL & SURGICAL HISTORY:  Motion sickness  Osteoarthritis  Class 3 severe obesity with body mass index (BMI) of 40.0 to 44.9 in adult  DUANE (obstructive sleep apnea): no treatment  S/P ORIF (open reduction internal fixation) fracture: 1994- LEFT Knee  S/P right knee surgery: 1977- ligament repair        ICU Vital Signs Last 24 Hrs  T(C): 37.2 (06 Nov 2019 23:30), Max: 37.2 (06 Nov 2019 23:30)  T(F): 99 (06 Nov 2019 23:30), Max: 99 (06 Nov 2019 23:30)  HR: 85 (06 Nov 2019 23:30) (70 - 85)  BP: 131/81 (06 Nov 2019 23:30) (98/54 - 156/85)  BP(mean): --  ABP: --  ABP(mean): --  RR: 18 (06 Nov 2019 23:30) (17 - 18)  SpO2: 96% (06 Nov 2019 23:30) (93% - 97%)    Qtts:     I&O's Summary    06 Nov 2019 07:01  -  07 Nov 2019 07:00  --------------------------------------------------------  IN: 0 mL / OUT: 600 mL / NET: -600 mL        Physical Examination:    General: No acute distress.  Obese male    HEENT: Pupils equal, reactive to light.  Symmetric.    PULM: Clear to auscultation bilaterally, no significant sputum production    CVS: Regular rate and rhythm, no murmurs, rubs, or gallops    ABD: Soft, nondistended, nontender, normoactive bowel sounds, no masses    EXT: No edema, slightly tender right knee,  Right knee bandaged  Slight swelling right lower leg    SKIN: Warm and well perfused, no rashes noted.    NEURO: Alert, oriented, interactive, nonfocal            LABS:                        12.7   10.16 )-----------( 188      ( 07 Nov 2019 07:12 )             39.2     11-06    136  |  98  |  15  ----------------------------<  113<H>  4.0   |  32<H>  |  1.02    Ca    9.1      06 Nov 2019 07:15            vanco through     RADIOLOGY & ADDITIONAL STUDIES:      CRITICAL CARE TIME SPENT:

## 2019-11-07 NOTE — PROGRESS NOTE ADULT - SUBJECTIVE AND OBJECTIVE BOX
MISTY EPPERSON  [id number]    Pt is a 59y year old Male s/p right TKR. pain is 3/10. (+) Voids, tolerating regular diet. Denies chest pain/shortness of breath/nausea/vomitting. Patient became diaphoretic and dizzy after taking Dilaudid yesterday prior to therapy.    Vital Signs Last 24 Hrs  T(C): 37.2 (06 Nov 2019 23:30), Max: 37.2 (06 Nov 2019 23:30)  T(F): 99 (06 Nov 2019 23:30), Max: 99 (06 Nov 2019 23:30)  HR: 85 (06 Nov 2019 23:30) (70 - 85)  BP: 131/81 (06 Nov 2019 23:30) (98/54 - 156/85)  BP(mean): --  RR: 18 (06 Nov 2019 23:30) (17 - 18)  SpO2: 96% (06 Nov 2019 23:30) (93% - 97%)    I&O's Detail    06 Nov 2019 07:01  -  07 Nov 2019 07:00  --------------------------------------------------------  IN:  Total IN: 0 mL    OUT:    Voided: 600 mL  Total OUT: 600 mL    Total NET: -600 mL                                12.7   10.16 )-----------( 188      ( 07 Nov 2019 07:12 )             39.2     11-06    136  |  98  |  15  ----------------------------<  113<H>  4.0   |  32<H>  |  1.02    Ca    9.1      06 Nov 2019 07:15          PE:   RLE: Dressing changed, incision clean and dry, no erythema or drainage nylon intact SILT distally, (+2) DP/PT pulses, EHL/FHL/TA intact, Capillary refill < 2 seconds. negative calf tenderness. PAS on    A: 59y year old Male s/p right TKR POD#3    Plan:   -DVT ppx =  aspirin enteric coated 81 milliGRAM(s) Oral every 12 hours    -PT/OT = OOB  -Pain control   -Medicine to follow   -continue to follow Labs  -continue use of PAS  -Dispo: home planning if patient does well with pain medications

## 2019-11-07 NOTE — PROGRESS NOTE ADULT - PROBLEM SELECTOR PROBLEM 2
Osteoarthritis
DUANE (obstructive sleep apnea)

## 2019-11-07 NOTE — PROGRESS NOTE ADULT - PROBLEM SELECTOR PLAN 1
Pain Management: acceptable- continue current care Tylenol ATC/Celebrex ATC/ Tramadol PRN  Continue PT/OT  DVT proph: [ x ] low risk - Aspirin   CPM ordered per Ortho  DC plan:  [x  ] Home with HC when cleared by PT  encourage oral fluid intake.

## 2019-11-07 NOTE — CHART NOTE - NSCHARTNOTEFT_GEN_A_CORE
Patient is s/p right total knee replacement POD#3. As noted by physical therapy the patient has been slowly progressing with his range of motion. As per therapy his current ROM is 5-60 degrees. It was recommended that the patient would benefit with a CPM. This was discussed with the patient and he agreed to try it. Plan was discussed with Dr. Henson and he agreed.

## 2019-11-18 ENCOUNTER — APPOINTMENT (OUTPATIENT)
Dept: ORTHOPEDIC SURGERY | Facility: CLINIC | Age: 59
End: 2019-11-18
Payer: COMMERCIAL

## 2019-11-18 VITALS — DIASTOLIC BLOOD PRESSURE: 83 MMHG | SYSTOLIC BLOOD PRESSURE: 131 MMHG | HEART RATE: 80 BPM

## 2019-11-18 PROCEDURE — 73562 X-RAY EXAM OF KNEE 3: CPT | Mod: RT

## 2019-11-18 PROCEDURE — 99024 POSTOP FOLLOW-UP VISIT: CPT

## 2019-11-18 RX ORDER — AMOXICILLIN 500 MG/1
500 CAPSULE ORAL
Qty: 20 | Refills: 4 | Status: ACTIVE | COMMUNITY
Start: 2019-11-18 | End: 1900-01-01

## 2020-01-07 ENCOUNTER — APPOINTMENT (OUTPATIENT)
Dept: ORTHOPEDIC SURGERY | Facility: CLINIC | Age: 60
End: 2020-01-07
Payer: COMMERCIAL

## 2020-01-07 VITALS — HEIGHT: 70 IN | HEART RATE: 88 BPM | SYSTOLIC BLOOD PRESSURE: 126 MMHG | DIASTOLIC BLOOD PRESSURE: 85 MMHG

## 2020-01-07 PROCEDURE — 73562 X-RAY EXAM OF KNEE 3: CPT | Mod: RT

## 2020-01-07 PROCEDURE — 99024 POSTOP FOLLOW-UP VISIT: CPT

## 2022-01-10 ENCOUNTER — APPOINTMENT (OUTPATIENT)
Dept: ORTHOPEDIC SURGERY | Facility: CLINIC | Age: 62
End: 2022-01-10
Payer: COMMERCIAL

## 2022-01-10 ENCOUNTER — NON-APPOINTMENT (OUTPATIENT)
Age: 62
End: 2022-01-10

## 2022-01-10 VITALS — DIASTOLIC BLOOD PRESSURE: 82 MMHG | HEART RATE: 64 BPM | SYSTOLIC BLOOD PRESSURE: 150 MMHG

## 2022-01-10 DIAGNOSIS — Z96.651 PRESENCE OF RIGHT ARTIFICIAL KNEE JOINT: ICD-10-CM

## 2022-01-10 PROCEDURE — 73562 X-RAY EXAM OF KNEE 3: CPT | Mod: RT

## 2022-01-10 PROCEDURE — 99213 OFFICE O/P EST LOW 20 MIN: CPT

## 2022-10-16 NOTE — PRE-OP CHECKLIST - CHLOROHEXIDINE WASH IN ASU
For information on Fall & Injury Prevention, visit: [email or web address] OR  [email or web address] OR  https://www.cdc.gov/steadi/patient.html 04-Nov-2019

## 2023-07-05 ENCOUNTER — APPOINTMENT (OUTPATIENT)
Dept: UROLOGY | Facility: CLINIC | Age: 63
End: 2023-07-05
Payer: COMMERCIAL

## 2023-07-05 VITALS
DIASTOLIC BLOOD PRESSURE: 79 MMHG | HEART RATE: 68 BPM | HEIGHT: 70 IN | RESPIRATION RATE: 16 BRPM | WEIGHT: 270 LBS | OXYGEN SATURATION: 96 % | SYSTOLIC BLOOD PRESSURE: 153 MMHG | TEMPERATURE: 98.2 F | BODY MASS INDEX: 38.65 KG/M2

## 2023-07-05 PROCEDURE — 99204 OFFICE O/P NEW MOD 45 MIN: CPT

## 2023-07-05 RX ORDER — TADALAFIL 5 MG/1
5 TABLET ORAL
Qty: 90 | Refills: 1 | Status: ACTIVE | COMMUNITY
Start: 2023-07-05 | End: 1900-01-01

## 2023-07-05 NOTE — ASSESSMENT
[FreeTextEntry1] : Very pleasant 62-year-old-gentleman who presents for evaluation of elevated PSA, BPH\par -We discussed the potential etiologies of an elevated PSA, including but not limited to prostate cancer, urinary tract infections, prostatitis, BPH, and recent ejaculation.\par -PSA today\par -BMP\par -urine culture\par -We discussed the differences between prostate MRI and a prostate biopsy for evaluation for prostate cancer.  We discussed the risks and benefits of both a prostate biopsy versus a prostate MRI, including the limitations of both.  We discussed the benefit of obtaining an MRI prior to a prostate biopsy with the advantage of being able to do a transperineal fusion biopsy after MRI.  After thorough discussion of the risks and benefits of each he would like to proceed with a prostate MRI in anticipation of a fusion biopsy\par -Continue Cialis 5 mg for daily use\par -We discussed the risks and benefits of Cialis\par -Follow-up in 3 to 4 weeks

## 2023-07-05 NOTE — HISTORY OF PRESENT ILLNESS
[FreeTextEntry1] : Very pleasant 62-year-old gentleman who presents for evaluation of elevated PSA, BPH. Reports that his PSA is typically above 7. He underwent a prostate biopsy approximately 3 years ago which was reportedly negative. No suprapubic pain. No dysuria.  No increased urinary frequency or urgency.  Nocturia x 2. No history of urinary tract infections or renal impairment. No gross hematuria.  No aggravating or alleviating factors. REports that he has taken Cialis for daily use in the past with significant improvement in his urinary symptoms.  He would like to continue taking this.\par \par No family history of  malignancy, including prostate cancer.  No family history of nephrolithiasis.

## 2023-07-06 LAB
ANION GAP SERPL CALC-SCNC: 15 MMOL/L
BUN SERPL-MCNC: 15 MG/DL
CALCIUM SERPL-MCNC: 9.4 MG/DL
CHLORIDE SERPL-SCNC: 104 MMOL/L
CO2 SERPL-SCNC: 24 MMOL/L
CREAT SERPL-MCNC: 1.17 MG/DL
EGFR: 70 ML/MIN/1.73M2
GLUCOSE SERPL-MCNC: 113 MG/DL
POTASSIUM SERPL-SCNC: 4.7 MMOL/L
PSA SERPL-MCNC: 11.6 NG/ML
SODIUM SERPL-SCNC: 143 MMOL/L

## 2023-07-08 LAB — BACTERIA UR CULT: NORMAL

## 2023-07-24 ENCOUNTER — NON-APPOINTMENT (OUTPATIENT)
Age: 63
End: 2023-07-24

## 2023-08-08 ENCOUNTER — TRANSCRIPTION ENCOUNTER (OUTPATIENT)
Age: 63
End: 2023-08-08

## 2023-08-08 ENCOUNTER — APPOINTMENT (OUTPATIENT)
Dept: UROLOGY | Facility: CLINIC | Age: 63
End: 2023-08-08
Payer: COMMERCIAL

## 2023-08-08 VITALS
HEIGHT: 70 IN | DIASTOLIC BLOOD PRESSURE: 81 MMHG | SYSTOLIC BLOOD PRESSURE: 149 MMHG | HEART RATE: 71 BPM | WEIGHT: 270 LBS | TEMPERATURE: 97.8 F | BODY MASS INDEX: 38.65 KG/M2 | OXYGEN SATURATION: 98 %

## 2023-08-08 PROCEDURE — 99214 OFFICE O/P EST MOD 30 MIN: CPT

## 2023-08-08 NOTE — ASSESSMENT
[FreeTextEntry1] : Mr. Wheeler is a very pleasant 63 year old man here today for elevated PSA. He reports feeling well with no real complaints. Denies any hematuria, dysuria or urinary retention. Reports feeling well after MRI w/ sedation. PSA 07/05/23 11.6. Prostate MRI images reviewed demonstrating an overall suspicion score of PIRADS 4 in the setting of a 49 cc prostate. UC. Fusion biopsy I discussed the recommendation to perform a transrectal ultrasound-guided prostate biopsy with the patient.  I reviewed the potential etiologies of an elevated PSA with the patient, including but not limited to prostate cancer, benign prostatic hyperplasia (BPH), inflammation of the prostate, urinary tract infection (UTI), older age, and sexual intercourse. I discussed the risks of a prostate biopsy with the patient, including but not limited to pain, rectal bleeding, blood in the urine and semen, difficulty or inability to urinate, and infection. We discussed the procedure itself, including the need to place a transrectal ultrasound probe in the rectum and take systematic biopsies of the prostate gland after providing a local anesthetic agent.  I explained the clifford-procedural preparations that the patient will need to take, including self-administration of an enema the day of the biopsy. He wishes to proceed and we will schedule the biopsy for the near future.

## 2023-08-08 NOTE — HISTORY OF PRESENT ILLNESS
[None] : None [FreeTextEntry1] : Mr. Wheeler is a very pleasant 63 year old man here today for elevated PSA, abnormal MRI. He reports feeling well with no real complaints. Denies any hematuria, dysuria or urinary retention. Reports feeling well after MRI w/ sedation.

## 2023-08-10 ENCOUNTER — OUTPATIENT (OUTPATIENT)
Dept: OUTPATIENT SERVICES | Facility: HOSPITAL | Age: 63
LOS: 1 days | End: 2023-08-10
Payer: COMMERCIAL

## 2023-08-10 DIAGNOSIS — Z98.890 OTHER SPECIFIED POSTPROCEDURAL STATES: Chronic | ICD-10-CM

## 2023-08-10 DIAGNOSIS — R97.20 ELEVATED PROSTATE SPECIFIC ANTIGEN [PSA]: ICD-10-CM

## 2023-08-10 LAB — BACTERIA UR CULT: NORMAL

## 2023-08-10 PROCEDURE — C8001: CPT

## 2023-09-06 ENCOUNTER — APPOINTMENT (OUTPATIENT)
Dept: UROLOGY | Facility: CLINIC | Age: 63
End: 2023-09-06
Payer: COMMERCIAL

## 2023-09-06 VITALS
OXYGEN SATURATION: 97 % | DIASTOLIC BLOOD PRESSURE: 72 MMHG | HEART RATE: 73 BPM | HEIGHT: 70 IN | SYSTOLIC BLOOD PRESSURE: 128 MMHG | WEIGHT: 270 LBS | TEMPERATURE: 97.8 F | BODY MASS INDEX: 38.65 KG/M2

## 2023-09-06 PROCEDURE — 76942 ECHO GUIDE FOR BIOPSY: CPT | Mod: 59

## 2023-09-06 PROCEDURE — 76377 3D RENDER W/INTRP POSTPROCES: CPT

## 2023-09-06 PROCEDURE — 76872 US TRANSRECTAL: CPT

## 2023-09-06 PROCEDURE — 55700: CPT | Mod: 22

## 2023-09-13 ENCOUNTER — APPOINTMENT (OUTPATIENT)
Dept: UROLOGY | Facility: CLINIC | Age: 63
End: 2023-09-13
Payer: COMMERCIAL

## 2023-09-13 VITALS
OXYGEN SATURATION: 98 % | DIASTOLIC BLOOD PRESSURE: 74 MMHG | HEIGHT: 70 IN | WEIGHT: 270 LBS | SYSTOLIC BLOOD PRESSURE: 140 MMHG | BODY MASS INDEX: 38.65 KG/M2 | TEMPERATURE: 97.8 F | HEART RATE: 66 BPM

## 2023-09-13 PROCEDURE — 99215 OFFICE O/P EST HI 40 MIN: CPT

## 2023-09-20 LAB — BACTERIA UR CULT: NORMAL

## 2023-10-04 ENCOUNTER — APPOINTMENT (OUTPATIENT)
Dept: UROLOGY | Facility: CLINIC | Age: 63
End: 2023-10-04
Payer: COMMERCIAL

## 2023-10-04 VITALS
HEART RATE: 59 BPM | WEIGHT: 270 LBS | HEIGHT: 70 IN | OXYGEN SATURATION: 97 % | TEMPERATURE: 97.3 F | BODY MASS INDEX: 38.65 KG/M2 | SYSTOLIC BLOOD PRESSURE: 121 MMHG | DIASTOLIC BLOOD PRESSURE: 72 MMHG

## 2023-10-04 DIAGNOSIS — N40.1 BENIGN PROSTATIC HYPERPLASIA WITH LOWER URINARY TRACT SYMPMS: ICD-10-CM

## 2023-10-04 DIAGNOSIS — N13.8 BENIGN PROSTATIC HYPERPLASIA WITH LOWER URINARY TRACT SYMPMS: ICD-10-CM

## 2023-10-04 PROCEDURE — 99214 OFFICE O/P EST MOD 30 MIN: CPT

## 2023-10-31 ENCOUNTER — APPOINTMENT (OUTPATIENT)
Dept: UROLOGY | Facility: CLINIC | Age: 63
End: 2023-10-31
Payer: COMMERCIAL

## 2023-10-31 DIAGNOSIS — C61 MALIGNANT NEOPLASM OF PROSTATE: ICD-10-CM

## 2023-10-31 DIAGNOSIS — R97.20 ELEVATED PROSTATE, SPECIFIC ANTIGEN [PSA]: ICD-10-CM

## 2023-10-31 PROCEDURE — 99442: CPT

## 2023-12-17 LAB — PROSTATE BIOPSY: NORMAL

## 2024-01-02 ENCOUNTER — APPOINTMENT (OUTPATIENT)
Dept: UROLOGY | Facility: CLINIC | Age: 64
End: 2024-01-02

## 2025-03-04 ENCOUNTER — APPOINTMENT (OUTPATIENT)
Dept: UROLOGY | Facility: CLINIC | Age: 65
End: 2025-03-04
Payer: COMMERCIAL

## 2025-03-04 VITALS
HEIGHT: 70 IN | BODY MASS INDEX: 39.37 KG/M2 | OXYGEN SATURATION: 95 % | WEIGHT: 275 LBS | DIASTOLIC BLOOD PRESSURE: 81 MMHG | SYSTOLIC BLOOD PRESSURE: 154 MMHG | HEART RATE: 85 BPM | TEMPERATURE: 97.2 F

## 2025-03-04 DIAGNOSIS — N13.8 BENIGN PROSTATIC HYPERPLASIA WITH LOWER URINARY TRACT SYMPMS: ICD-10-CM

## 2025-03-04 DIAGNOSIS — N40.1 BENIGN PROSTATIC HYPERPLASIA WITH LOWER URINARY TRACT SYMPMS: ICD-10-CM

## 2025-03-04 DIAGNOSIS — C61 MALIGNANT NEOPLASM OF PROSTATE: ICD-10-CM

## 2025-03-04 DIAGNOSIS — R97.20 ELEVATED PROSTATE, SPECIFIC ANTIGEN [PSA]: ICD-10-CM

## 2025-03-04 PROCEDURE — G2211 COMPLEX E/M VISIT ADD ON: CPT | Mod: NC

## 2025-03-04 PROCEDURE — 99214 OFFICE O/P EST MOD 30 MIN: CPT

## 2025-03-05 LAB
ANION GAP SERPL CALC-SCNC: 13 MMOL/L
BUN SERPL-MCNC: 18 MG/DL
CALCIUM SERPL-MCNC: 9.2 MG/DL
CHLORIDE SERPL-SCNC: 108 MMOL/L
CO2 SERPL-SCNC: 23 MMOL/L
CREAT SERPL-MCNC: 1.16 MG/DL
EGFRCR SERPLBLD CKD-EPI 2021: 70 ML/MIN/1.73M2
GLUCOSE SERPL-MCNC: 99 MG/DL
POTASSIUM SERPL-SCNC: 4.5 MMOL/L
PSA SERPL-MCNC: 15 NG/ML
SODIUM SERPL-SCNC: 144 MMOL/L

## 2025-04-01 ENCOUNTER — APPOINTMENT (OUTPATIENT)
Dept: UROLOGY | Facility: CLINIC | Age: 65
End: 2025-04-01

## 2025-04-08 ENCOUNTER — APPOINTMENT (OUTPATIENT)
Dept: UROLOGY | Facility: CLINIC | Age: 65
End: 2025-04-08